# Patient Record
Sex: MALE | Race: WHITE | NOT HISPANIC OR LATINO | Employment: OTHER | URBAN - NONMETROPOLITAN AREA
[De-identification: names, ages, dates, MRNs, and addresses within clinical notes are randomized per-mention and may not be internally consistent; named-entity substitution may affect disease eponyms.]

---

## 2024-07-01 ENCOUNTER — OFFICE VISIT (OUTPATIENT)
Dept: UROLOGY | Facility: CLINIC | Age: 74
End: 2024-07-01
Payer: MEDICARE

## 2024-07-01 VITALS
HEART RATE: 76 BPM | DIASTOLIC BLOOD PRESSURE: 82 MMHG | BODY MASS INDEX: 31.61 KG/M2 | HEIGHT: 72 IN | SYSTOLIC BLOOD PRESSURE: 137 MMHG | WEIGHT: 233.4 LBS

## 2024-07-01 DIAGNOSIS — R97.20 BPH WITH ELEVATED PSA: Primary | ICD-10-CM

## 2024-07-01 DIAGNOSIS — N40.0 BPH WITH ELEVATED PSA: Primary | ICD-10-CM

## 2024-07-01 DIAGNOSIS — N40.1 BPH WITH OBSTRUCTION/LOWER URINARY TRACT SYMPTOMS: ICD-10-CM

## 2024-07-01 DIAGNOSIS — R35.0 BENIGN PROSTATIC HYPERPLASIA WITH URINARY FREQUENCY: ICD-10-CM

## 2024-07-01 DIAGNOSIS — N40.1 BENIGN PROSTATIC HYPERPLASIA WITH URINARY FREQUENCY: ICD-10-CM

## 2024-07-01 DIAGNOSIS — N13.8 BPH WITH OBSTRUCTION/LOWER URINARY TRACT SYMPTOMS: ICD-10-CM

## 2024-07-01 PROCEDURE — 1160F RVW MEDS BY RX/DR IN RCRD: CPT | Performed by: NURSE PRACTITIONER

## 2024-07-01 PROCEDURE — 99204 OFFICE O/P NEW MOD 45 MIN: CPT | Performed by: NURSE PRACTITIONER

## 2024-07-01 PROCEDURE — 1159F MED LIST DOCD IN RCRD: CPT | Performed by: NURSE PRACTITIONER

## 2024-07-01 PROCEDURE — 84153 ASSAY OF PSA TOTAL: CPT | Performed by: NURSE PRACTITIONER

## 2024-07-01 PROCEDURE — 84154 ASSAY OF PSA FREE: CPT | Performed by: NURSE PRACTITIONER

## 2024-07-01 NOTE — PROGRESS NOTES
Chief Complaint  BPH WITH ELEVATED PSA/LUTS (NEW PATIENT)    Subjective          Girish Moon presents to [facility] for BPH WITH ELEVATED PSA 35.70 /LUTS  History of Present Illness   History of Present Illness  The patient is a pleasant 74-year-old male who presents to clinic today for evaluation as a new patient consult. The patient has been referred to clinic by PCP for BPH with elevated PSA. His PSA is 35.70 and this was completed on 05/15/2024. He is unsure about any prior PSA results. The patient is relatively healthy. He reports BPH with lower urinary tract symptoms, most bothersome of which has been urinary urgency and frequency. His IPSS score in clinic today is 11. He is accompanied by his wife.    The patient's wife reports that he has not had a physical examination in 30 to 40 years. He does not take any medications. He experiences urinary frequency, approximately every 2 hours, and experiences urinary urgency. His urinary stream is generally regular, albeit with occasional variability. He experiences nocturia, waking up 3 times per night to urinate, which does not disrupt his sleep. He denies any hematuria, bladder infections, skin infections, or urinary tract infections. He also denies any perineal pain or lower back pain.     He has not undergone a prostate examination or an MRI. He maintains an active lifestyle, attending the gym approximately 3 days per week.     The patient denies smoking. He is a  and a .     He denies any family history of prostate cancer or bladder cancer.    Active Ambulatory Problems     Diagnosis Date Noted    BPH with elevated PSA 07/01/2024    Benign prostatic hyperplasia with urinary frequency 07/01/2024     Resolved Ambulatory Problems     Diagnosis Date Noted    No Resolved Ambulatory Problems     No Additional Past Medical History      Objective   Vital Signs:   /82 (BP Location:  "Left arm, Patient Position: Sitting, Cuff Size: Adult)   Pulse 76   Ht 182.9 cm (72\")   Wt 106 kg (233 lb 6.4 oz)   BMI 31.65 kg/m²       ROS:   Review of Systems   Constitutional:  Positive for activity change, appetite change, fatigue and unexpected weight gain. Negative for chills and fever.   HENT: Negative.  Negative for congestion and sinus pressure.    Eyes: Negative.  Negative for blurred vision and double vision.   Respiratory:  Positive for wheezing. Negative for shortness of breath.    Cardiovascular: Negative.    Gastrointestinal:  Positive for abdominal distention and abdominal pain. Negative for constipation, diarrhea, nausea and vomiting.   Endocrine: Negative.    Genitourinary:  Positive for urgency. Negative for difficulty urinating, discharge, dysuria, flank pain, frequency, genital sores, hematuria, penile pain, penile swelling, scrotal swelling, testicular pain and urinary incontinence.   Musculoskeletal: Negative.  Negative for back pain.   Skin:  Positive for dry skin and pallor.   Allergic/Immunologic: Negative.    Neurological:  Positive for dizziness. Negative for weakness and confusion.   Hematological: Negative.    Psychiatric/Behavioral:  Positive for sleep disturbance and stress. Negative for agitation, behavioral problems and decreased concentration.         Physical Exam  Constitutional:       General: He is in acute distress.      Appearance: He is well-developed. He is obese.   HENT:      Head: Normocephalic and atraumatic.      Right Ear: External ear normal.      Left Ear: External ear normal.   Eyes:      General:         Right eye: No discharge.         Left eye: No discharge.      Conjunctiva/sclera: Conjunctivae normal.      Pupils: Pupils are equal, round, and reactive to light.   Neck:      Thyroid: No thyromegaly.      Trachea: No tracheal deviation.   Cardiovascular:      Rate and Rhythm: Normal rate and regular rhythm.      Heart sounds: No murmur heard.     No " friction rub.   Pulmonary:      Effort: Pulmonary effort is normal. No respiratory distress.      Breath sounds: Normal breath sounds. No stridor.   Abdominal:      General: Bowel sounds are normal. There is distension.      Palpations: Abdomen is soft.      Tenderness: There is abdominal tenderness. There is no guarding.   Genitourinary:     Penis: Normal and uncircumcised. No tenderness or discharge.       Testes: Normal.      Rectum: Normal. Guaiac result negative.      Comments: BPH with urinary hesitancy/urgency.  PATIENT HAS AND ENLARGED PROSTATE/BPH WITH LUTS  ANITA POSITIVE FOR SMALL RIGHT PALPABLE NODULE.  He has good rectal tone, and ENLARGED BUT VERY FIRM PROSTATE. There is no OTHER suspicious rectal abnormalities.  Otherwise normal external genitalia. Bilateral descended testes without any masses, left slightly and hanging lower than the right testicles. There are no inguinal hernias or abnormalities noted.    Musculoskeletal:         General: No tenderness or deformity. Normal range of motion.      Cervical back: Normal range of motion and neck supple.   Skin:     General: Skin is warm and dry.      Capillary Refill: Capillary refill takes less than 2 seconds.      Coloration: Skin is not pale.   Neurological:      Mental Status: He is alert and oriented to person, place, and time.      Cranial Nerves: No cranial nerve deficit.      Motor: Weakness present.      Coordination: Coordination normal.   Psychiatric:         Behavior: Behavior normal.         Thought Content: Thought content normal.         Judgment: Judgment normal.        Physical Exam    Result Review :            Assessment and Plan    Problem List Items Addressed This Visit          Genitourinary and Reproductive     BPH with elevated PSA - Primary    Relevant Orders    PSA Total+% Free (Serial)    MRI Prostate With & Without Contrast    Benign prostatic hyperplasia with urinary frequency    Relevant Orders    PSA Total+% Free (Serial)     MRI Prostate With & Without Contrast       Assessment & Plan       ASSESSMENT                         BPH WITH ELEVATED PSA /LUTS-URGENCY/HESITANCY  MR PASCALE MAYS is a pleasant 74-year-old male who presents to clinic today for evaluation with new concerns for BPH with Elevated PSA OF  35.70 and this was completed on 05/15/2024. He is unsure about any prior PSA results. The patient is relatively healthy. He reports BPH with minor lower urinary tract symptoms, most bothersome of which has been urinary urgency and frequency. His IPSS score in clinic today is 11.     BPH: FIRST, WE Discussed the pathophysiology of BPH and obstruction. We discussed the static and dynamic effects of BPH as well as using 5 alpha reductase inhibitors versus alpha blockade.  We discussed the indications for transurethral surgery as well.  And/ or other therapeutic options available including all of the newer techniques.  He has  real symptomatology referable to his prostate other than moderate enlargement.  Recommending we proceed with extensive workup if his repeat PSA/free and total percentages become concerning.  I am also recommending an alpha blocker tamsulosin 0.4 mg capsule daily, and finasteride 5 mg daily if patient were to become symptomatic-deferred at this time.    Elevated PSA :Pt has been referred to us with a PSA of 35.70. He has a fairly enlarged enlarged/firm prostate with only minor urinary symptoms of frequency and urgency which is intermittent, depending on his fluid intake or how late he consumes any beverage prior to bedtime. We discussed the diagnosis of Elevated Prostate-Specific antigen (PSA).  I explained the pathophysiology of PSA.  It is a serine protease. It's  function in the male reproductive tract is to facilitate the liquefaction of semen.  It is for this reason the body does not want it freely floating in the serum and why it is typically bound tightly to albumin.     We discussed why we most typically will  use both a PSA free and Total to determine the need for more aggressive therapy. I discussed the normal range, and how it varies with age groups and descent.  Additionally, I explained to him typically PSA was in the range of 1-4 but more recently has been downgraded to something less than 2 or even approaching 1. I discussed the risk of family history particularly the fact that the average male has a 14% risk of prostate cancer and that in the face of a positive diagnosis in a father it will tablet and any other first-generation relative continued tablet insofar that a father and brother with prostate cancer will produce almost a 50% risk of prostate cancer.  I discussed the use of the temporal use of PSA as the best option for monitoring.  We also discussed the fact that an elevated PSA is an isolated event does not mean that this is prostate cancer and should not engender worry in this regard.     FINALLY, I also discussed other things that can elevate PSA including constipation, prostatitis, infection, recent intercourse etc, as well as the risks and benefits associated with this.  Also discussed the fact that this is a dilutional test and as a consequence of such, will occasionally produce slight variations on a single specimen.                                             PLAN  Patient has been scheduled for MRI of his prostate ON 07/12/24    He will be scheduled for prostate ultrasound-guided biopsy pending results for definitive plan of care.    Further discussed the risks and benefits of a prostate biopsy as well if indicated.      His PSA was repeated today: Free and total PSA pending to complete the work-up    Also did send urine for [facility] prostate cancer testing    I will call patient with results and definitive plan of care.    Discussed starting Flomax 0.4 mg/finasteride 5 mg for BPH/enlarged prostate/LUTS-deferred pending workup.  Will restart if negative workup    Patient/wife agreeable plan of care.        1. Benign prostatic hyperplasia with elevated PSA.  A digital rectal exam will be performed. Today, a free and total PSA test will be repeated. An MRI will be scheduled. Pending the results, a biopsy will be performed.    Follow-up  A follow-up appointment is scheduled for 2 weeks from now.    Patient reports that he is not currently experiencing any symptoms of urinary incontinence.      BMI is >= 30 and <35. (Class 1 Obesity). The following options were offered after discussion;: weight loss educational material (shared in after visit summary), exercise counseling/recommendations, and nutrition counseling/recommendations      RADIOLOGY (CT AND/OR KUB):    CT Abdomen and Pelvis: No results found for this or any previous visit.     CT Stone Protocol: No results found for this or any previous visit.     KUB: No results found for this or any previous visit.       [unfilled]  LABS (3 MONTHS):    No results found for any previous visit.        IPSS Questionnaire (AUA-7):  Over the past month…    1)  How often have you had a sensation of not emptying your bladder completely after you finish urinating?  0 - Not at all   2)  How often have you had to urinate again less than two hours after you finished urinating? 2 - Less than half the time   3)  How often have you found you stopped and started again several times when you urinated?  0 - Not at all   4) How difficult have you found it to postpone urination?  5 - Almost always   5) How often have you had a weak urinary stream?  1 - Less than 1 time in 5   6) How often have you had to push or strain to begin urination?  0 - Not at all   7) How many times did you most typically get up to urinate from the time you went to bed until the time you got up in the morning?  4 - 4 times   Total Score:  11       Follow Up   Return in about 2 weeks (around 7/15/2024) for Next scheduled follow up, FOR BPH WITH LUTS/PSA/REVIEW RESULTS-DISCUSS SCHEDULING PROSTATE  BIOPSY.    Patient was given instructions and counseling regarding his condition or for health maintenance advice. Please see specific information pulled into the AVS if appropriate.          This document has been electronically signed by Griselda Cheng-Akwa, APRN   July 1, 2024 12:52 EDT      Dictated Utilizing Dragon Dictation: Part of this note may be an electronic transcription/translation of spoken language to printed text using the Dragon Dictation System.      Patient or patient representative verbalized consent for the use of Ambient Listening during the visit with  Griselda Cheng-Akwa, APRN for chart documentation. 7/1/2024  12:52 EDT

## 2024-07-02 LAB
PSA FREE MFR SERPL: 7.8 %
PSA FREE SERPL-MCNC: 2.32 NG/ML
PSA SERPL-MCNC: 29.6 NG/ML (ref 0–4)

## 2024-07-12 ENCOUNTER — HOSPITAL ENCOUNTER (OUTPATIENT)
Dept: MRI IMAGING | Facility: HOSPITAL | Age: 74
Discharge: HOME | End: 2024-07-12
Payer: MEDICARE

## 2024-07-12 LAB — CREAT BLDA-MCNC: 1 MG/DL (ref 0.6–1.3)

## 2024-07-12 PROCEDURE — 82565 ASSAY OF CREATININE: CPT

## 2024-07-12 PROCEDURE — A9577 INJ MULTIHANCE: HCPCS | Performed by: NURSE PRACTITIONER

## 2024-07-12 PROCEDURE — 0 GADOBENATE DIMEGLUMINE 529 MG/ML SOLUTION: Performed by: NURSE PRACTITIONER

## 2024-07-12 PROCEDURE — 72197 MRI PELVIS W/O & W/DYE: CPT

## 2024-07-12 RX ADMIN — GADOBENATE DIMEGLUMINE 20 ML: 529 INJECTION, SOLUTION INTRAVENOUS at 17:16

## 2024-07-16 ENCOUNTER — OFFICE VISIT (OUTPATIENT)
Dept: UROLOGY | Facility: CLINIC | Age: 74
End: 2024-07-16
Payer: MEDICARE

## 2024-07-16 ENCOUNTER — TELEPHONE (OUTPATIENT)
Dept: UROLOGY | Facility: CLINIC | Age: 74
End: 2024-07-16

## 2024-07-16 VITALS
DIASTOLIC BLOOD PRESSURE: 87 MMHG | SYSTOLIC BLOOD PRESSURE: 139 MMHG | HEART RATE: 67 BPM | WEIGHT: 230.6 LBS | BODY MASS INDEX: 31.23 KG/M2 | HEIGHT: 72 IN

## 2024-07-16 DIAGNOSIS — Z12.5 PROSTATE CANCER SCREENING: ICD-10-CM

## 2024-07-16 DIAGNOSIS — R97.20 BPH WITH ELEVATED PSA: Primary | ICD-10-CM

## 2024-07-16 DIAGNOSIS — N13.8 BPH WITH OBSTRUCTION/LOWER URINARY TRACT SYMPTOMS: ICD-10-CM

## 2024-07-16 DIAGNOSIS — R35.0 BENIGN PROSTATIC HYPERPLASIA WITH URINARY FREQUENCY: ICD-10-CM

## 2024-07-16 DIAGNOSIS — N40.1 BPH WITH OBSTRUCTION/LOWER URINARY TRACT SYMPTOMS: ICD-10-CM

## 2024-07-16 DIAGNOSIS — N40.0 BPH WITH ELEVATED PSA: Primary | ICD-10-CM

## 2024-07-16 DIAGNOSIS — N40.1 BENIGN PROSTATIC HYPERPLASIA WITH URINARY FREQUENCY: ICD-10-CM

## 2024-07-16 PROCEDURE — 99214 OFFICE O/P EST MOD 30 MIN: CPT | Performed by: NURSE PRACTITIONER

## 2024-07-16 PROCEDURE — 1160F RVW MEDS BY RX/DR IN RCRD: CPT | Performed by: NURSE PRACTITIONER

## 2024-07-16 PROCEDURE — 1159F MED LIST DOCD IN RCRD: CPT | Performed by: NURSE PRACTITIONER

## 2024-07-16 RX ORDER — DIAZEPAM 10 MG/1
TABLET ORAL
Qty: 2 TABLET | Refills: 0 | Status: SHIPPED | OUTPATIENT
Start: 2024-07-16

## 2024-07-16 RX ORDER — CLINDAMYCIN HYDROCHLORIDE 300 MG/1
300 CAPSULE ORAL 2 TIMES DAILY
Qty: 6 CAPSULE | Refills: 0 | Status: SHIPPED | OUTPATIENT
Start: 2024-07-16 | End: 2024-07-19

## 2024-07-16 RX ORDER — CIPROFLOXACIN 500 MG/1
TABLET, FILM COATED ORAL
Qty: 4 TABLET | Refills: 0 | Status: SHIPPED | OUTPATIENT
Start: 2024-07-16

## 2024-07-16 NOTE — PROGRESS NOTES
Chief Complaint  BPH with elevated PSA (2 WEEKS FOLLOW UP-REVIEW PSA/MDX/MRI RESULTS)    Subjective          Girish Moon presents to [facility] for BPH WITH ELEVATED PSA/LUTS/-REVIEW PSA/MDX/MRI RESULTS-SCHEDULE BIOPSY  History of Present Illness   History of Present Illness  The patient is a pleasant 74-year-old male who returns to clinic today for evaluation.  He is accompanied by his wife Sandra Gardiner.  This is a 2-week follow-up for BPH with elevated PSA/LUTS most bothersome of which has been urine frequency, nocturia, and constant feeling of incomplete bladder emptying.     Initially evaluated on 07/01/2024, the patient had presented to clinic with an elevated PSA of 35.70. This was completed on 05/15/2024. The patient was unsure about any prior PSA results. It was his first time being evaluated for prostate. He is relatively healthy, reported BPH with lower urinary tract symptoms, most bothersome of which has been urinary urgency and frequency. His IPSS score on initial evaluation was 11.     Prior to being evaluated, the patient reported not been to a doctor, having a physical examination for 30 to 40 years. He is currently not on any medications and reported his urinary frequency every 2 hours, urgency and nocturia was not very bothersome to him. On his initial evaluation in clinic, we did proceed with a ANITA which had shown an enlarged prostate. It was also positive for a small LEFT> right palpable nodule. The patient did have a good rectal tone, an enlarged BUT very firm /NODULAR prostate concerning for abnormality.     Due to his positive digital rectal exam, we had discussed plan of care to include repeating his PSA results obtain a free and total. We also discussed getting an MDX urine testing and if indicated, proceed with an MRI of his prostate. He returns to clinic today for evaluation.  Urinalysis again is negative for leukocyte esterase, it is negative  "for nitrites, it is negative for gross/microscopic hematuria.    The patient's repeat PSA on 07/01/2024 was at 29.6. Nevertheless, it did show a free PSA of 7.8 percent which was significant giving patient a greater than 55 percent chance of prostate cancer.     We also proceeded with Select MDX results for prostate cancer post ANITA 07/1/2024 which was also very significant.  It showed a 95 percent likelihood of prostate cancer upon biopsy and a significant 83 percent likelihood of dictating a Iliana score that was greater than or equal to 7 for prostate cancer.     For definitive plan of care, the patient completed his MRI on 07/15/2024 which I have reviewed showing a 1.2 cm PI-RADS 4 lesion in the left peripheral zone towards the apex of the gland. The patient's prostate was enlarged also on MRI with nodular enlargement of the transitional zone. His prostate gland measured 5.9 x 6.0 x 6.0 cm.     Overall, the patient's urinary symptoms have remained stable.  He denies any bothersome effects today.  He denies dysuria or any burning with urination, he denies flank pain, abdominal pain, denies pelvic pressure or suprapubic discomfort.  He denies perineal pain, has not had any chills or fever post clinic evaluation.     He denies any family history of prostate cancer.    We discussed his definitive plan of care to include scheduling for Biopsy ASAP and patient/wife is agreeable.      Prostate ultrasound-guided biopsy have been scheduled for August 15, 2024 Dr. Murphy in clinic.    Active Ambulatory Problems     Diagnosis Date Noted    BPH with obstruction/lower urinary tract symptoms 07/01/2024    Benign prostatic hyperplasia with urinary frequency 07/01/2024    Prostate cancer screening 07/16/2024     Resolved Ambulatory Problems     Diagnosis Date Noted    No Resolved Ambulatory Problems     No Additional Past Medical History      Objective   Vital Signs:   /87   Pulse 67   Ht 182.9 cm (72.01\")   Wt 105 kg " (230 lb 9.6 oz)   BMI 31.27 kg/m²       ROS:   Review of Systems   Constitutional:  Positive for activity change, appetite change, fatigue and unexpected weight gain. Negative for chills and fever.   HENT: Negative.  Negative for congestion and sinus pressure.    Eyes: Negative.  Negative for blurred vision and double vision.   Respiratory:  Positive for wheezing. Negative for shortness of breath.    Cardiovascular: Negative.    Gastrointestinal:  Positive for abdominal distention and abdominal pain. Negative for constipation, diarrhea, nausea and vomiting.   Endocrine: Negative.    Genitourinary:  Positive for flank pain, nocturia and urgency. Negative for difficulty urinating, discharge, dysuria, frequency, genital sores, hematuria, penile pain, penile swelling, scrotal swelling, testicular pain and urinary incontinence.   Musculoskeletal:  Positive for myalgias. Negative for back pain.   Skin:  Positive for dry skin and pallor.   Allergic/Immunologic: Negative.    Neurological:  Positive for dizziness. Negative for weakness and confusion.   Hematological: Negative.    Psychiatric/Behavioral:  Positive for sleep disturbance and stress. Negative for agitation, behavioral problems and decreased concentration.         Physical Exam  Constitutional:       General: He is in acute distress.      Appearance: He is well-developed. He is obese. He is ill-appearing.   HENT:      Head: Normocephalic and atraumatic.      Right Ear: External ear normal.      Left Ear: External ear normal.   Eyes:      General:         Right eye: No discharge.         Left eye: No discharge.      Conjunctiva/sclera: Conjunctivae normal.      Pupils: Pupils are equal, round, and reactive to light.   Neck:      Thyroid: No thyromegaly.      Trachea: No tracheal deviation.   Cardiovascular:      Rate and Rhythm: Normal rate and regular rhythm.      Heart sounds: No murmur heard.     No friction rub.   Pulmonary:      Effort: Pulmonary effort is  normal. No respiratory distress.      Breath sounds: Normal breath sounds. No stridor.   Abdominal:      General: Bowel sounds are normal. There is distension.      Palpations: Abdomen is soft.      Tenderness: There is abdominal tenderness. There is no guarding.   Genitourinary:     Penis: Normal and uncircumcised. No tenderness or discharge.       Testes: Normal.      Rectum: Normal. Guaiac result negative.      Comments: BPH with LUTS, urinary frequency, urgency at times and nocturia.  ANITA positive for nodularity concerning for abnormality  Musculoskeletal:         General: Tenderness present. No deformity. Normal range of motion.      Cervical back: Normal range of motion and neck supple.   Skin:     General: Skin is warm and dry.      Capillary Refill: Capillary refill takes less than 2 seconds.      Coloration: Skin is pale.   Neurological:      Mental Status: He is alert and oriented to person, place, and time.      Cranial Nerves: No cranial nerve deficit.      Motor: Weakness present.      Coordination: Coordination normal.   Psychiatric:         Behavior: Behavior normal.         Thought Content: Thought content normal.         Judgment: Judgment normal.        Physical Exam       Result Review :         PSA          7/1/2024    09:21   PSA   PSA 29.6             Assessment and Plan    Problem List Items Addressed This Visit          Genitourinary and Reproductive     BPH with obstruction/lower urinary tract symptoms - Primary    Relevant Medications    ciprofloxacin (Cipro) 500 MG tablet    clindamycin (CLEOCIN) 300 MG capsule    diazePAM (VALIUM) 10 MG tablet    Benign prostatic hyperplasia with urinary frequency    Relevant Medications    ciprofloxacin (Cipro) 500 MG tablet    clindamycin (CLEOCIN) 300 MG capsule    diazePAM (VALIUM) 10 MG tablet    Prostate cancer screening    Relevant Medications    ciprofloxacin (Cipro) 500 MG tablet    clindamycin (CLEOCIN) 300 MG capsule    diazePAM (VALIUM) 10 MG  tablet       Assessment & Plan                                         ASSESSMENT                  BPH WITH ELEVATED PSA /LUTS-URGENCY/HESITANCY/NOCTURIA  MR PASCALE MAYS is a pleasant 74-year-old male who returns to clinic today for evaluation.  This is a 2 weeks follow-up with prior concerns for BPH with Elevated PSA OF  35.70 and this was completed on 05/15/2024.  His repeat PSA on 07/1/24 was 29.5, with a free PSA of 7%.  He is unsure about any prior PSA results.  Currently not taking any medications for his prostate.     The patient is relatively healthy. He reports BPH with minor lower urinary tract symptoms, most bothersome of which has been urinary urgency and frequency and intermittent nocturia. His last IPSS score in clinic was 11.      We discussed proceeding with MDX select urine testing post his ANITA which is also significant.  It showed a 95 percent likelihood of prostate cancer upon biopsy and a significant 83 percent likelihood of dictating a Iliana score that was greater than or equal to 7 for prostate cancer. For definitive plan of care, the patient completed his MRI on 07/15/2024 which I have reviewed showing a 1.2 cm PI-RADS 4 lesion in the left peripheral zone towards the apex of the gland.      BPH: FIRST, WE Discussed the pathophysiology of BPH and obstruction. We discussed the static and dynamic effects of BPH as well as using 5 alpha reductase inhibitors versus alpha blockade.  We discussed the indications for transurethral surgery as well.  And/ or other therapeutic options available including all of the newer techniques.  He has  real symptomatology referable to his prostate other than moderate enlargement.  Recommending we proceed with extensive workup if his repeat PSA/free and total percentages become concerning.  I am also recommending an alpha blocker tamsulosin 0.4 mg capsule daily, and finasteride 5 mg daily if patient were to become symptomatic-deferred at this time.     Elevated  PSA :Pt HAD been referred to us with a PSA of 35.70.  His repeat PSA was 29 with a free PSA of 7% giving him a greater than 55% chance of prostate cancer.  Also PI-RADS score of 4 with a 1.2 cm left peripheral zone lesion significant.  He has a fairly enlarged enlarged/firm prostate with only minor urinary symptoms of frequency and urgency which is intermittent, depending on his fluid intake or how late he consumes any beverage prior to bedtime.     We discussed the diagnosis of Elevated Prostate-Specific antigen (PSA).  I explained the pathophysiology of PSA.  It is a serine protease. It's  function in the male reproductive tract is to facilitate the liquefaction of semen.  It is for this reason the body does not want it freely floating in the serum and why it is typically bound tightly to albumin.      We discussed why we most typically will use both a PSA free and Total to determine the need for more aggressive therapy. I discussed the normal range, and how it varies with age groups and descent.  Additionally, I explained to him typically PSA was in the range of 1-4 but more recently has been downgraded to something less than 2 or even approaching 1. I discussed the risk of family history particularly the fact that the average male has a 14% risk of prostate cancer and that in the face of a positive diagnosis in a father it will tablet and any other first-generation relative continued tablet insofar that a father and brother with prostate cancer will produce almost a 50% risk of prostate cancer.  I discussed the use of the temporal use of PSA as the best option for monitoring.  We also discussed the fact that an elevated PSA is an isolated event does not mean that this is prostate cancer and should not engender worry in this regard.      FINALLY, I also discussed other things that can elevate PSA including constipation, prostatitis, infection, recent intercourse etc, as well as the risks and benefits associated  with this.  Also discussed the fact that this is a dilutional test and as a consequence of such, will occasionally produce slight variations on a single specimen.                                              PLAN  Patient has been scheduled for A Prostate Ultrasound-Guided biopsy ON  August 15, 2024 Dr. Murphy in clinic.    Recent medications-ciprofloxacin, clindamycin, Valium, and enema with preop instructions.     Patient educated to hold all blood thinners 5 to 7 days prior to procedure-reports not taking any    Patient had been scheduled for MRI of his prostate ON 07/12/24-reviewed PI-RADS 4 score     Further discussed the risks and benefits of a prostate biopsy as well if indicated.       His PSA HAD BEEN repeated : Free and total PSA to complete the [id number] 25 with free PSA of 7     Also did send urine for MDX prostate cancer testing-significantly greater than 95% likelihood upon biopsy, and 85% likelihood of Iliana score greater than equal to 7     Patient will follow-up in clinic with Dr. Murphy postbiopsy for results and definitive plan of care     Discussed starting Flomax 0.4 mg/finasteride 5 mg for BPH/enlarged prostate/LUTS-deferred pending workup.    He is to call clinic with any concerns prior to procedure     Patient/wife agreeable plan of care.     1. Benign prostatic hyperplasia with elevated PSA-SUMMARY.  The patient has been scheduled for a prostate ultrasound-guided biopsy on 08/15/2024 with Dr. Murphy. Concurrently, conservative therapy will be maintained. A follow-up appointment will be scheduled post-biopsy for the results with Dr. Charlton. The patient is advised to return sooner if any concerns arise. Medications, antibiotics, and Valium have been sent to the patient's pharmacy. Enema preparations for the biopsy have been provided to the patient and his wife.    Patient reports that he is not currently experiencing any symptoms of urinary incontinence.    RADIOLOGY (CT  AND/OR KUB):    CT Abdomen and Pelvis: No results found for this or any previous visit.     CT Stone Protocol: No results found for this or any previous visit.     KUB: No results found for this or any previous visit.       [unfilled]  LABS (3 MONTHS):    Hospital Outpatient Visit on 07/12/2024   Component Date Value Ref Range Status    Creatinine 07/12/2024 1.00  0.60 - 1.30 mg/dL Final    Serial Number: [id number]:  [id number]   Office Visit on 07/01/2024   Component Date Value Ref Range Status    PSA 07/01/2024 29.6 (H)  0.0 - 4.0 ng/mL Final    Roche ECLIA methodology.  According to the American Urological Association, Serum PSA should  decrease and remain at undetectable levels after radical  prostatectomy. The AUA defines biochemical recurrence as an initial  PSA value 0.2 ng/mL or greater followed by a subsequent confirmatory  PSA value 0.2 ng/mL or greater.  Values obtained with different assay methods or kits cannot be used  interchangeably. Results cannot be interpreted as absolute evidence  of the presence or absence of malignant disease.    PSA, Free 07/01/2024 2.32  N/A ng/mL Final    Roche ECLIA methodology.    PSA, Free % 07/01/2024 7.8  % Final    The table below lists the probability of prostate cancer for  men with non-suspicious ANITA results and total PSA between  4 and 10 ng/mL, by patient age (Tiara et al, CLEMENCIA 1998,  279:1542).                    % Free PSA       50-64 yr        65-75 yr                    0.00-10.00%        56%             55%                   10.01-15.00%        24%             35%                   15.01-20.00%        17%             23%                   20.01-25.00%        10%             20%                        >25.00%         5%              9%  Please note:  Tiara et al did not make specific                recommendations regarding the use of                percent free PSA for any other population                of men.          Smoking Cessation  Counseling:  Never a smoker.  Patient does not currently use any tobacco products.       Follow Up   Return in about 30 days (around 8/15/2024) for Next scheduled follow up, With Dr. Murphy, FOR  BPH W LUTS/ELEVATED PSA-PROSTATE US GUIDED BIOPSY.    Patient was given instructions and counseling regarding his condition or for health maintenance advice. Please see specific information pulled into the AVS if appropriate.          This document has been electronically signed by Griselda Cheng-Akwa, APRN   July 16, 2024 11:22 EDT      Dictated Utilizing Dragon Dictation: Part of this note may be an electronic transcription/translation of spoken language to printed text using the Dragon Dictation System.      Patient or patient representative verbalized consent for the use of Ambient Listening during the visit with  Griselda Cheng-Akwa, APRN for chart documentation. 7/16/2024  11:22 EDT

## 2024-08-06 DIAGNOSIS — Z12.5 PROSTATE CANCER SCREENING: ICD-10-CM

## 2024-08-06 DIAGNOSIS — N13.8 BPH WITH OBSTRUCTION/LOWER URINARY TRACT SYMPTOMS: ICD-10-CM

## 2024-08-06 DIAGNOSIS — R97.20 BPH WITH ELEVATED PSA: ICD-10-CM

## 2024-08-06 DIAGNOSIS — N40.1 BENIGN PROSTATIC HYPERPLASIA WITH URINARY FREQUENCY: ICD-10-CM

## 2024-08-06 DIAGNOSIS — N40.0 BPH WITH ELEVATED PSA: ICD-10-CM

## 2024-08-06 DIAGNOSIS — N40.1 BPH WITH OBSTRUCTION/LOWER URINARY TRACT SYMPTOMS: ICD-10-CM

## 2024-08-06 DIAGNOSIS — R35.0 BENIGN PROSTATIC HYPERPLASIA WITH URINARY FREQUENCY: ICD-10-CM

## 2024-08-06 RX ORDER — DIAZEPAM 10 MG/1
TABLET ORAL
Qty: 2 TABLET | Refills: 0 | Status: SHIPPED | OUTPATIENT
Start: 2024-08-06

## 2024-08-07 NOTE — TELEPHONE ENCOUNTER
Medications resent-valium to [facility] drug pharmacy in [facility].  Patient scheduled for prostate ultrasound-guided biopsy on 08/15/2024.

## 2024-08-15 ENCOUNTER — PROCEDURE VISIT (OUTPATIENT)
Dept: UROLOGY | Facility: CLINIC | Age: 74
End: 2024-08-15
Payer: MEDICARE

## 2024-08-15 VITALS
HEART RATE: 76 BPM | BODY MASS INDEX: 30.85 KG/M2 | WEIGHT: 227.8 LBS | HEIGHT: 72 IN | DIASTOLIC BLOOD PRESSURE: 85 MMHG | SYSTOLIC BLOOD PRESSURE: 135 MMHG

## 2024-08-15 DIAGNOSIS — N40.1 BPH WITH OBSTRUCTION/LOWER URINARY TRACT SYMPTOMS: ICD-10-CM

## 2024-08-15 DIAGNOSIS — N40.0 BPH WITH ELEVATED PSA: Primary | ICD-10-CM

## 2024-08-15 DIAGNOSIS — R97.20 BPH WITH ELEVATED PSA: Primary | ICD-10-CM

## 2024-08-15 DIAGNOSIS — R97.20 ELEVATED PROSTATE SPECIFIC ANTIGEN (PSA): ICD-10-CM

## 2024-08-15 DIAGNOSIS — N13.8 BPH WITH OBSTRUCTION/LOWER URINARY TRACT SYMPTOMS: ICD-10-CM

## 2024-08-15 DIAGNOSIS — Z48.816 AFTERCARE FOLLOWING SURGERY OF THE GENITOURINARY SYSTEM: ICD-10-CM

## 2024-08-15 RX ORDER — GENTAMICIN 40 MG/ML
80 INJECTION, SOLUTION INTRAMUSCULAR; INTRAVENOUS ONCE
Status: COMPLETED | OUTPATIENT
Start: 2024-08-15 | End: 2024-08-15

## 2024-08-15 RX ADMIN — GENTAMICIN 80 MG: 40 INJECTION, SOLUTION INTRAMUSCULAR; INTRAVENOUS at 14:21

## 2024-08-15 NOTE — PROGRESS NOTES
Chief Complaint:   Elevated PSA    HPI:    74-year-old male who returns to clinic today for evaluation.  He is accompanied by his wife Sandra Gardiner.  This is a 2-week follow-up for BPH with elevated PSA/LUTS most bothersome of which has been urine frequency, nocturia, and constant feeling of incomplete bladder emptying.      Initially evaluated on 07/01/2024, the patient had presented to clinic with an elevated PSA of 35.70. This was completed on 05/15/2024. The patient was unsure about any prior PSA results. It was his first time being evaluated for prostate. He is relatively healthy, reported BPH with lower urinary tract symptoms, most bothersome of which has been urinary urgency and frequency. His IPSS score on initial evaluation was 11.      Prior to being evaluated, the patient reported not been to a doctor, having a physical examination for 30 to 40 years. He is currently not on any medications and reported his urinary frequency every 2 hours, urgency and nocturia was not very bothersome to him. On his initial evaluation in clinic, we did proceed with a ANITA which had shown an enlarged prostate. It was also positive for a small LEFT> right palpable nodule. The patient did have a good rectal tone, an enlarged BUT very firm /NODULAR prostate concerning for abnormality.      Due to his positive digital rectal exam, we had discussed plan of care to include repeating his PSA results obtain a free and total. We also discussed getting an MDX urine testing and if indicated, proceed with an MRI of his prostate. He returns to clinic today for evaluation.  Urinalysis again is negative for leukocyte esterase, it is negative for nitrites, it is negative for gross/microscopic hematuria.     The patient's repeat PSA on 07/01/2024 was at 29.6. Nevertheless, it did show a free PSA of 7.8 percent which was significant giving patient a greater than 55 percent chance of prostate cancer.      We also proceeded with [facility] MDX  results for prostate cancer post ANITA 07/1/2024 which was also very significant.  It showed a 95 percent likelihood of prostate cancer upon biopsy and a significant 83 percent likelihood of dictating a [place] score that was greater than or equal to 7 for prostate cancer.      For definitive plan of care, the patient completed his MRI on 07/15/2024 which I have reviewed showing a 1.2 cm PI-RADS 4 lesion in the left peripheral zone towards the apex of the gland. The patient's prostate was enlarged also on MRI with nodular enlargement of the transitional zone. His prostate gland measured 5.9 x 6.0 x 6.0 cm.      Overall, the patient's urinary symptoms have remained stable.  He denies any bothersome effects today.  He denies dysuria or any burning with urination, he denies flank pain, abdominal pain, denies pelvic pressure or suprapubic discomfort.  He denies perineal pain, has not had any chills or fever post clinic evaluation.      He denies any family history of prostate cancer.     We discussed his definitive plan of care to include scheduling for Biopsy ASAP and patient/wife is agreeable.         Past Medical History:     No past medical history on file.    Current Meds:     Current Outpatient Medications   Medication Sig Dispense Refill    ciprofloxacin (Cipro) 500 MG tablet Take one tablet twice a day before procedure 4 tablet 0    diazePAM (VALIUM) 10 MG tablet One tablet night before procedure at bedtime and , THE ONE MORNING OF PROCEDURE-PLEASE BRING TO CLINIC 2 tablet 0     No current facility-administered medications for this visit.        Allergies:      No Known Allergies     Past Surgical History:     Past Surgical History:   Procedure Laterality Date    KNEE SURGERY Left        Social History:     Social History     Socioeconomic History    Marital status:    Tobacco Use    Smoking status: Never     Passive exposure: Never    Smokeless tobacco: Never   Vaping Use    Vaping status: Never Used    Substance and Sexual Activity    Alcohol use: Defer    Drug use: Defer    Sexual activity: Defer       Family History:     No family history on file.    Review of Systems:     Review of Systems   Constitutional: Negative.    HENT: Negative.     Eyes: Negative.    Respiratory: Negative.     Cardiovascular: Negative.    Gastrointestinal: Negative.    Endocrine: Negative.    Musculoskeletal: Negative.    Allergic/Immunologic: Negative.    Neurological: Negative.    Hematological: Negative.    Psychiatric/Behavioral: Negative.         Physical Exam:     Physical Exam  Vitals and nursing note reviewed.   Constitutional:       Appearance: He is well-developed.   HENT:      Head: Normocephalic and atraumatic.   Eyes:      Conjunctiva/sclera: Conjunctivae normal.      Pupils: Pupils are equal, round, and reactive to light.   Cardiovascular:      Rate and Rhythm: Normal rate and regular rhythm.      Heart sounds: Normal heart sounds.   Pulmonary:      Effort: Pulmonary effort is normal.      Breath sounds: Normal breath sounds.   Abdominal:      General: Bowel sounds are normal.      Palpations: Abdomen is soft.   Musculoskeletal:         General: Normal range of motion.      Cervical back: Normal range of motion.   Skin:     General: Skin is warm and dry.   Neurological:      Mental Status: He is alert and oriented to person, place, and time.      Deep Tendon Reflexes: Reflexes are normal and symmetric.   Psychiatric:         Behavior: Behavior normal.         Thought Content: Thought content normal.         Judgment: Judgment normal.         I have reviewed the following portions of the patient's history: Allergies, current medications, past family history, past medical history, past social history, past surgical history, problem list, and ROS and confirm it is accurate.    Recent Image (CT and/or KUB):      CT Abdomen and Pelvis: No results found for this or any previous visit.       CT Stone Protocol: No results found  for this or any previous visit.       KUB: No results found for this or any previous visit.       Labs (past 3 months):      Hospital Outpatient Visit on 07/12/2024   Component Date Value Ref Range Status    Creatinine 07/12/2024 1.00  0.60 - 1.30 mg/dL Final    Serial Number: [id number]:  [id number]   Office Visit on 07/01/2024   Component Date Value Ref Range Status    PSA 07/01/2024 29.6 (H)  0.0 - 4.0 ng/mL Final    Roche ECLIA methodology.  According to the American Urological Association, Serum PSA should  decrease and remain at undetectable levels after radical  prostatectomy. The AUA defines biochemical recurrence as an initial  PSA value 0.2 ng/mL or greater followed by a subsequent confirmatory  PSA value 0.2 ng/mL or greater.  Values obtained with different assay methods or kits cannot be used  interchangeably. Results cannot be interpreted as absolute evidence  of the presence or absence of malignant disease.    PSA, Free 07/01/2024 2.32  N/A ng/mL Final    Roche ECLIA methodology.    PSA, Free % 07/01/2024 7.8  % Final    The table below lists the probability of prostate cancer for  men with non-suspicious ANITA results and total PSA between  4 and 10 ng/mL, by patient age (Tiara et al, CLEMENCIA 1998,  279:1542).                    % Free PSA       50-64 yr        65-75 yr                    0.00-10.00%        56%             55%                   10.01-15.00%        24%             35%                   15.01-20.00%        17%             23%                   20.01-25.00%        10%             20%                        >25.00%         5%              9%  Please note:  Tiara et al did not make specific                recommendations regarding the use of                percent free PSA for any other population                of men.        Procedure:     Prostate ultrasound and biopsy:  74 year-old white male with a history of an elevated PSA and a significant increase in his risk for prostate cancer.   We discussed the prostate biopsy at length.  We discussed the significant risks of anesthesia, bleeding, infection, urosepsis, purported effects on erectile function, and rectal bleeding requiring surgical intervention.  He was given a pre-procedural enema.  He was pretreated with ciprofloxacin for 3 days.  He was given periprocedural gentamicin at the dose of 80 mg in an intramuscular fashion and given post biopsy clindamycin for 3 days.  Following an extensive informed consent, he was brought to the operative suite, placed in the left lateral decubitus position.  He was given his prophylactic gentamicin.  The rectum was anesthetized with 20 mL of 2% viscous Xylocaine jelly.  I then used the BK biplanar probe inserted into the rectum and made measurements of the prostate.  The height was 5.13 cm, the width was 5.78 cm, and the length was 6.43 cm for a volume of 99.14 g.  There were no obvious hypoechoic areas.  There was no intravesical median lobe.  There was minimal calcification between the transition and peripheral zone.  I then injected 20 mL of 1% Xylocaine in the perirectal area and raised of skin wheal to provide anesthesia after an adequate period of topical anesthesia. I used the Biopty gun to take a total of 10 cores without complication.  He tolerated this extremely well.  Cores.  There was no bleeding or complications.   Impression: Elevated PSA s/p biopsy.  Assessment/Plan:   Elevated prostate specific antigen-we discussed the diagnosis of elevated prostate-specific antigen.  I explained the pathophysiology of PSA.  It is a serine protease that's function in the male reproductive tract is to facilitate the liquefaction of semen.  It is for this reason the body does not want it freely floating in the serum and why typically bound tightly to albumin.  We discussed why we used both a PSA free and total to determine the need for more aggressive therapy. I discussed the normal range.  Additionally, it was  in the range of 1 to 4, but more recently has been downgraded to something less than 2 or even approaching 1.  I discussed the risk of family history, particularly the fact that the average male has a 14% risk of prostate cancer and that in the face of a positive diagnosis in a father it will tablet and any other first-generation relative continued tablet insofar that a father and brother with prostate cancer will produce almost a 50% risk of prostate cancer.  I discussed the use of the temporal use of PSA as the best option for monitoring.  We also discussed the fact that an elevated PSA is an isolated event does not mean that this is prostate cancer and should not engender worry in this regard. I discussed other things that can elevate PSA including constipation, prostatitis, infection, recent intercourse etc., as well as the risks and benefits associated with this.  Also discussed the fact that this is with a dilutional test and as a consequence of such were present produce slight variations on a single specimen.  Further discussed the risks and benefits of a prostate biopsy as well.              This document has been electronically signed by NELIDA DE LEÓN MD August 15, 2024 13:08 EDT    Dictated Utilizing Dragon Dictation: Part of this note may be an electronic transcription/translation of spoken language to printed text using the Dragon Dictation System.

## 2024-08-16 PROBLEM — R97.20 ELEVATED PROSTATE SPECIFIC ANTIGEN (PSA): Status: ACTIVE | Noted: 2024-08-16

## 2024-08-16 LAB — REF LAB TEST METHOD: NORMAL

## 2024-08-26 ENCOUNTER — OFFICE VISIT (OUTPATIENT)
Dept: UROLOGY | Facility: CLINIC | Age: 74
End: 2024-08-26
Payer: MEDICARE

## 2024-08-26 VITALS
WEIGHT: 233 LBS | DIASTOLIC BLOOD PRESSURE: 90 MMHG | HEIGHT: 72 IN | BODY MASS INDEX: 31.56 KG/M2 | HEART RATE: 76 BPM | SYSTOLIC BLOOD PRESSURE: 158 MMHG

## 2024-08-26 DIAGNOSIS — C61 PROSTATE CANCER: ICD-10-CM

## 2024-08-26 DIAGNOSIS — Z12.5 PROSTATE CANCER SCREENING: Primary | ICD-10-CM

## 2024-08-26 PROCEDURE — 1160F RVW MEDS BY RX/DR IN RCRD: CPT | Performed by: UROLOGY

## 2024-08-26 PROCEDURE — 99213 OFFICE O/P EST LOW 20 MIN: CPT | Performed by: UROLOGY

## 2024-08-26 PROCEDURE — 1159F MED LIST DOCD IN RCRD: CPT | Performed by: UROLOGY

## 2024-08-27 RX ORDER — BICALUTAMIDE 50 MG/1
50 TABLET, FILM COATED ORAL DAILY
Qty: 30 TABLET | Refills: 3 | Status: SHIPPED | OUTPATIENT
Start: 2024-08-27 | End: 2024-12-25

## 2024-09-11 ENCOUNTER — HOSPITAL ENCOUNTER (OUTPATIENT)
Dept: NUCLEAR MEDICINE | Facility: HOSPITAL | Age: 74
Discharge: HOME | End: 2024-09-11
Payer: MEDICARE

## 2024-09-11 DIAGNOSIS — Z12.5 PROSTATE CANCER SCREENING: ICD-10-CM

## 2024-09-11 PROCEDURE — A9561 TC99M OXIDRONATE: HCPCS | Performed by: UROLOGY

## 2024-09-11 PROCEDURE — 0 TECHNETIUM OXIDRONATE KIT: Performed by: UROLOGY

## 2024-09-11 PROCEDURE — 78306 BONE IMAGING WHOLE BODY: CPT

## 2024-09-11 RX ADMIN — TECHNETIUM TC 99M OXIDRONATE 1 DOSE: 3.15 INJECTION, POWDER, LYOPHILIZED, FOR SOLUTION INTRAVENOUS at 09:02

## 2024-09-16 ENCOUNTER — HOSPITAL ENCOUNTER (OUTPATIENT)
Dept: CT IMAGING | Facility: HOSPITAL | Age: 74
Discharge: HOME | End: 2024-09-16
Admitting: UROLOGY
Payer: MEDICARE

## 2024-09-16 DIAGNOSIS — Z12.5 PROSTATE CANCER SCREENING: ICD-10-CM

## 2024-09-16 LAB — CREAT BLDA-MCNC: 0.9 MG/DL (ref 0.6–1.3)

## 2024-09-16 PROCEDURE — 74178 CT ABD&PLV WO CNTR FLWD CNTR: CPT

## 2024-09-16 PROCEDURE — 25510000001 IOPAMIDOL 61 % SOLUTION: Performed by: UROLOGY

## 2024-09-16 PROCEDURE — 82565 ASSAY OF CREATININE: CPT

## 2024-09-16 RX ORDER — IOPAMIDOL 612 MG/ML
100 INJECTION, SOLUTION INTRAVASCULAR
Status: COMPLETED | OUTPATIENT
Start: 2024-09-16 | End: 2024-09-16

## 2024-09-16 RX ADMIN — IOPAMIDOL 100 ML: 612 INJECTION, SOLUTION INTRAVENOUS at 11:03

## 2024-09-23 PROBLEM — C61 PROSTATE CANCER: Status: ACTIVE | Noted: 2024-09-23

## 2024-09-24 ENCOUNTER — OFFICE VISIT (OUTPATIENT)
Dept: UROLOGY | Facility: CLINIC | Age: 74
End: 2024-09-24
Payer: MEDICARE

## 2024-09-24 VITALS
HEIGHT: 72 IN | WEIGHT: 228.8 LBS | HEART RATE: 68 BPM | DIASTOLIC BLOOD PRESSURE: 78 MMHG | BODY MASS INDEX: 30.99 KG/M2 | SYSTOLIC BLOOD PRESSURE: 120 MMHG

## 2024-09-24 DIAGNOSIS — C61 PROSTATE CANCER: Primary | ICD-10-CM

## 2024-09-24 PROCEDURE — 1159F MED LIST DOCD IN RCRD: CPT | Performed by: UROLOGY

## 2024-09-24 PROCEDURE — 99214 OFFICE O/P EST MOD 30 MIN: CPT | Performed by: UROLOGY

## 2024-09-24 PROCEDURE — 1160F RVW MEDS BY RX/DR IN RCRD: CPT | Performed by: UROLOGY

## 2024-09-25 DIAGNOSIS — C61 PROSTATE CANCER: Primary | ICD-10-CM

## 2024-10-16 ENCOUNTER — OFFICE VISIT (OUTPATIENT)
Dept: RADIATION ONCOLOGY | Facility: HOSPITAL | Age: 74
End: 2024-10-16
Payer: MEDICARE

## 2024-10-16 ENCOUNTER — HOSPITAL ENCOUNTER (OUTPATIENT)
Dept: RADIATION ONCOLOGY | Facility: HOSPITAL | Age: 74
Setting detail: RADIATION/ONCOLOGY SERIES
Discharge: HOME | End: 2024-10-16
Payer: MEDICARE

## 2024-10-16 VITALS
WEIGHT: 233.2 LBS | HEIGHT: 72 IN | DIASTOLIC BLOOD PRESSURE: 89 MMHG | RESPIRATION RATE: 20 BRPM | BODY MASS INDEX: 31.59 KG/M2 | HEART RATE: 74 BPM | OXYGEN SATURATION: 97 % | SYSTOLIC BLOOD PRESSURE: 153 MMHG | TEMPERATURE: 97.5 F

## 2024-10-16 DIAGNOSIS — C61 PROSTATE CANCER: Primary | ICD-10-CM

## 2024-10-16 PROCEDURE — G0463 HOSPITAL OUTPT CLINIC VISIT: HCPCS

## 2024-10-16 NOTE — PROGRESS NOTES
CONSULTATION NOTE      :                                                          1950  DATE OF CONSULTATION:                       10/16/2024   REQUESTING PHYSICIAN:                   Pablo Murphy, *  REASON FOR CONSULTATION:           Prostate cancer  - Stage IIIC (cT2c, cN0, cM0, PSA: 29.6, Grade Group: 5)         BRIEF HISTORY:  The patient is a very pleasant 74 y.o. male  with recent diagnosis of prostate cancer.  He presented with an elevated PSA value of 35.7 ng/mL 5/15/2024.  Repeat PSA 2020 was 29.6 ng/mL.  ANITA revealed significant BPH and a nodule on the right.  MRI 2024 showed heterogeneous prostate with estimated volume 99.14 cc.  There was a PI-RADS 4 suspicious index lesion at the left peripheral zone towards the apex measuring 1.2 cm.  There was no abnormality involving the seminal vesicles or neurovascular bundles.  No pathologic lymphadenopathy.  Biopsy was performed 8/15/2024.  There was presence of prostatic adenocarcinoma bilaterally.  3 cores from the right lobe showed [place]'s 5+4 = 9 0.25% of the sample from the right apex and [place]'s 3+4 = 7 involving 5% of tissue from the right base and 10% tissue from the right lateral base.  2 cores from the left lobe showed Iliana's 4+4 = 8 involving 5% tissue from the left base with presence of perineural invasion and [place]'s 3+4 = 7 involving 5% tissue from the left lateral base.  CT abdomen pelvis showed prostatic enlargement with no obvious extraprostatic spread.  No pathologic adenopathy.  There was small scattered nonspecific sclerotic lesions in the bones.  Nuclear medicine bone scan showed no evidence of metastasis.    Patient initiated bicalutamide in 2024.  He is having no hot flashes or other adverse effects.    He is otherwise healthy and active and should have predicted longevity greater than 10 years.  He is interested in definitive treatment for the prostate cancer.  He specifically asked for an  CyberKnife stereotactic body radiotherapy given the fact that he lives a distance away.    Allergy: No Known Allergies    Social History:   Social History     Socioeconomic History    Marital status:    Tobacco Use    Smoking status: Never     Passive exposure: Never    Smokeless tobacco: Never   Vaping Use    Vaping status: Never Used   Substance and Sexual Activity    Alcohol use: Defer     Comment: 18 beers per week    Drug use: Defer    Sexual activity: Defer       Past Medical History:   Past Medical History:   Diagnosis Date    Prostate cancer        Family History: family history includes Arthritis in his mother; Stroke in his father.     Surgical History:   Past Surgical History:   Procedure Laterality Date    KNEE SURGERY Left     PROSTATE BIOPSY      VARICOSE VEIN SURGERY      VEIN SURGERY          Review of Systems:   Review of Systems   HENT:   Positive for hearing loss.    Cardiovascular:  Positive for leg swelling.        Left ankle           IPSS Questionnaire (AUA-7):  Over the past month…    1)  Incomplete Emptying  How often have you had a sensation of not emptying your bladder?  1 - Less than 1 time in 5   2)  Frequency  How often have you had to urinate less than every two hours? 1 - Less than 1 time in 5   3)  Intermittency  How often have you found you stopped and started again several times when you urinated?  2 - Less than half the time   4) Urgency  How often have you found it difficult to postpone urination?  1 - Less than 1 time in 5   5) Weak Stream  How often have you had a weak urinary stream?  2 - Less than half the time   6) Straining  How often have you had to push or strain to begin urination?  1 - Less than 1 time in 5   7) Nocturia  How many times did you typically get up at night to urinate?  3 - 3 times   Total Score:  11       Quality of life due to urinary symptoms:  If you were to spend the rest of your life with your urinary condition the way it is now, how would you  "feel about that? 2-Mostly Satisfied   Urine Leakage (Incontinence) 0-No Leakage     Sexual Health Inventory  Current Status    1)  How do you rate your confidence that you could achieve and keep an erection? 1-Very Low   2) When you had erections with sexual stimulation, how often were your erections hard enough for penetration (entering your partner)? 1-Almost never or never   3)  During sexual intercourse, how often were you able to maintain your erection after you had penetrated (entered) into your partner? 1-Almost never or never   4) During sexual intercourse, how difficult was it to maintain your erection to completion of intercourse? 2-Very difficult   5) When you attempted sexual intercourse, how often was it satisfactory to you? 3-Sometime (about half the time)   Total Score: 8       Bowel Health Inventory  Current Status: 0-No problems, no rectal bleeding, no discharge, less then 5 bowel movements a day            Objective   VITAL SIGNS:   Vitals:    10/16/24 0935   BP: 153/89   Pulse: 74   Resp: 20   Temp: 97.5 °F (36.4 °C)   TempSrc: Skin   SpO2: 97%   Weight: 106 kg (233 lb 3.2 oz)   Height: 182.9 cm (72\")   PainSc: 0-No pain        Karnofsky score: 90       Physical Exam:   Physical Exam  Vitals and nursing note reviewed.   Constitutional:       Appearance: He is well-developed.   HENT:      Head: Normocephalic and atraumatic.   Cardiovascular:      Rate and Rhythm: Normal rate and regular rhythm.      Heart sounds: Normal heart sounds. No murmur heard.  Pulmonary:      Effort: Pulmonary effort is normal.      Breath sounds: Normal breath sounds. No wheezing or rales.   Abdominal:      General: Bowel sounds are normal. There is no distension.      Palpations: Abdomen is soft.      Tenderness: There is no abdominal tenderness.   Genitourinary:     Prostate: Enlarged (80 to 100 cc estimated volume, symmetric, wide, flat, firm diffusely without raised nodules.  Seminal vesicles are nonpalpable.). Not " tender and no nodules present.      Rectum: No mass, tenderness, anal fissure, external hemorrhoid or internal hemorrhoid. Normal anal tone.   Musculoskeletal:         General: No tenderness. Normal range of motion.      Cervical back: Normal range of motion and neck supple.   Lymphadenopathy:      Cervical: No cervical adenopathy.      Upper Body:      Right upper body: No supraclavicular adenopathy.      Left upper body: No supraclavicular adenopathy.   Skin:     General: Skin is warm and dry.   Neurological:      Mental Status: He is alert and oriented to person, place, and time.      Sensory: No sensory deficit.   Psychiatric:         Behavior: Behavior normal.         Thought Content: Thought content normal.         Judgment: Judgment normal.              The following portions of the patient's history were reviewed and updated as appropriate: allergies, current medications, past family history, past medical history, past social history, past surgical history, and problem list.    Assessment:   Assessment      Prostate cancer, [place]'s 5+ 4 =9, clinical stage IIIC (T2c, N0, M0), PSA 29.6 ng/mL.  We reviewed the radiotherapy treatment options using intensity-modulated radiotherapy with concurrent androgen ablation which has already been initiated versus stereotactic body radiotherapy.  Patient is most interested in stereotactic body radiotherapy.  Before proceeding with localized treatment to the prostate gland only, would like to complete staging with a PSMA PET/CT to make sure he does not have any obvious metastatic disease.  Patient is agreeable with that plan.    RECOMMENDATIONS: PSMA PET/CT evaluation.  He is overdue for colon cancer screening and is willing to have a Cologuard test.  Gold seed fiducial placement with Dr. Murphy.  He will subsequently return here for treatment planning with the intent to treat the prostate gland and seminal vesicles in 5 fractions on the CyberKnife on an every other day  treatment schedule.  If the PSMA PET scan shows extraprostatic disease or metastasis, that plan may change.    I spent a total of 55 minutes on todays visit, with more than 40 minutes in direct face to face communication, and the remainder of the time spent in reviewing the relevant history, records, available imaging, and for documentation.    Follow Up:   Return in about 4 weeks (around 11/13/2024) for Office Visit.  Diagnoses and all orders for this visit:    1. Prostate cancer (Primary)  -     NM Pet Skull Base To Mid Thigh; Future  -     Ambulatory Referral to ONC Social Work         Walker Coley MD

## 2024-10-17 ENCOUNTER — DOCUMENTATION (OUTPATIENT)
Dept: OTHER | Facility: HOSPITAL | Age: 74
End: 2024-10-17
Payer: MEDICARE

## 2024-10-17 ENCOUNTER — TELEPHONE (OUTPATIENT)
Dept: RADIATION ONCOLOGY | Facility: HOSPITAL | Age: 74
End: 2024-10-17
Payer: MEDICARE

## 2024-10-17 DIAGNOSIS — C61 PROSTATE CANCER: Primary | ICD-10-CM

## 2024-10-17 NOTE — TELEPHONE ENCOUNTER
Attempted x 2 to call pt regarding upcoming appointments.  Detailed message left.  Instructed to call office with any questions.

## 2024-10-17 NOTE — PROGRESS NOTES
Distress Screening Follow-up    Name: Girish Moon    : 1950    Diagnosis:  Prostate Cancer    Location of Distress Screening: [facility]    Distress Level: 4 (10/16/2024 10:11 AM)    Emotional Concerns:  Worry or anxiety: Y    Practical Concerns:  Treatment decisions: Y     Interventions:        Comments:  SOPHIE contacted pt to follow up on Distress Screen from recent visit. SW provided introductions and explained nature of the call. Pt communicated he is doing okay, and stated at this time him and his wife plan to drive to daily treatments. SW provided education on resources available as pt is 2 hours away. Pt stated if treatment plan changes, and its more than 5 treatments, he would like lodging assistance. SW encouraged pt to keep phone number and contact should treatment plan change. Pt was agreeable and thanked SOPHIE for the call. SW will be available should needs arise.

## 2024-10-23 ENCOUNTER — TELEPHONE (OUTPATIENT)
Dept: UROLOGY | Facility: CLINIC | Age: 74
End: 2024-10-23
Payer: MEDICARE

## 2024-10-23 DIAGNOSIS — C61 PROSTATE CANCER: Primary | ICD-10-CM

## 2024-10-23 RX ORDER — SODIUM PHOSPHATE,MONO-DIBASIC 19G-7G/118
ENEMA (ML) RECTAL
Qty: 1 ENEMA | Refills: 0 | Status: SHIPPED | OUTPATIENT
Start: 2024-10-23

## 2024-10-23 RX ORDER — DIAZEPAM 10 MG
TABLET ORAL
Qty: 2 TABLET | Refills: 0 | Status: SHIPPED | OUTPATIENT
Start: 2024-10-23

## 2024-10-23 RX ORDER — CLINDAMYCIN HCL 300 MG
300 CAPSULE ORAL 2 TIMES DAILY
Qty: 6 CAPSULE | Refills: 0 | Status: SHIPPED | OUTPATIENT
Start: 2024-10-23 | End: 2024-10-26

## 2024-10-23 RX ORDER — CIPROFLOXACIN 500 MG/1
TABLET, FILM COATED ORAL
Qty: 4 TABLET | Refills: 0 | Status: SHIPPED | OUTPATIENT
Start: 2024-10-23

## 2024-10-23 NOTE — TELEPHONE ENCOUNTER
Patient has been scheduled for fiducial marker placement. He needs enema and prescriptions sent to [facility] in [place]. Patient is not on any blood thinners.

## 2024-10-30 ENCOUNTER — HOSPITAL ENCOUNTER (OUTPATIENT)
Facility: HOSPITAL | Age: 74
Discharge: HOME | End: 2024-10-30
Payer: MEDICARE

## 2024-10-30 DIAGNOSIS — C61 PROSTATE CANCER: ICD-10-CM

## 2024-10-30 PROCEDURE — 78815 PET IMAGE W/CT SKULL-THIGH: CPT

## 2024-10-30 PROCEDURE — A9595 PIFLUFOLASTAT F 18 9 MCI SOLUTION PREFILLED SYRINGE: HCPCS | Performed by: RADIOLOGY

## 2024-10-30 PROCEDURE — 0 PIFLUFOLASTAT F 18 9 MCI SOLUTION PREFILLED SYRINGE: Performed by: RADIOLOGY

## 2024-10-30 RX ADMIN — PIFLUFOLASTAT F-18 1 DOSE: 80 INJECTION INTRAVENOUS at 11:06

## 2024-11-05 ENCOUNTER — OFFICE VISIT (OUTPATIENT)
Dept: RADIATION ONCOLOGY | Facility: HOSPITAL | Age: 74
End: 2024-11-05
Payer: MEDICARE

## 2024-11-05 ENCOUNTER — HOSPITAL ENCOUNTER (OUTPATIENT)
Dept: RADIATION ONCOLOGY | Facility: HOSPITAL | Age: 74
Setting detail: RADIATION/ONCOLOGY SERIES
Discharge: HOME | End: 2024-11-05
Payer: MEDICARE

## 2024-11-05 ENCOUNTER — HOSPITAL ENCOUNTER (OUTPATIENT)
Facility: HOSPITAL | Age: 74
Setting detail: RADIATION/ONCOLOGY SERIES
End: 2024-11-05
Payer: MEDICARE

## 2024-11-05 VITALS
SYSTOLIC BLOOD PRESSURE: 125 MMHG | DIASTOLIC BLOOD PRESSURE: 70 MMHG | RESPIRATION RATE: 20 BRPM | BODY MASS INDEX: 31.21 KG/M2 | OXYGEN SATURATION: 97 % | HEART RATE: 77 BPM | TEMPERATURE: 97.5 F | WEIGHT: 230.1 LBS

## 2024-11-05 DIAGNOSIS — C61 PROSTATE CANCER: Primary | ICD-10-CM

## 2024-11-05 PROCEDURE — G0463 HOSPITAL OUTPT CLINIC VISIT: HCPCS

## 2024-11-05 NOTE — PROGRESS NOTES
RE-EVALUATION    PATIENT:                                                      Girish Moon  :                                                          1950  DATE:                          2024   DIAGNOSIS:     Prostate cancer  - Stage IIIC (cT2c, cN0, cM0, PSA: 29.6, Grade Group: 5)         BRIEF HISTORY:  The patient is a very pleasant 74 y.o. male  with recent diagnosis of high risk prostate cancer.  He was interested in CyberKnife stereotactic body radiotherapy.  He initiated androgen ablation with bicalutamide in 2024.  PSMA PET/CT evaluation was performed 10/30/2024.  This showed expected hypermetabolism in the periphery of the left side of the prostate at the apex and mid body with SUV 53.6.  There is also uptake in the proximal seminal vesicles, SUV 25.2, consistent with direct extension into the seminal vesicles.  There were also 2 small subcentimeter left iliac lymph nodes, SUV 8.9 consistent with regional lymph node metastasis.    No Known Allergies    Review of Systems   HENT:   Positive for hearing loss.            Objective   VITAL SIGNS:   Vitals:    24 1302   BP: 125/70   Pulse: 77   Resp: 20   Temp: 97.5 °F (36.4 °C)   TempSrc: Skin   SpO2: 97%   Weight: 104 kg (230 lb 1.6 oz)   PainSc: 0-No pain        Karnofsky score: 90       Physical Exam  Vitals and nursing note reviewed.   Constitutional:       Appearance: He is well-developed.   HENT:      Head: Normocephalic and atraumatic.   Cardiovascular:      Rate and Rhythm: Normal rate and regular rhythm.      Heart sounds: Normal heart sounds. No murmur heard.  Pulmonary:      Effort: Pulmonary effort is normal.      Breath sounds: Normal breath sounds. No wheezing or rales.   Abdominal:      General: Bowel sounds are normal. There is no distension.      Palpations: Abdomen is soft.      Tenderness: There is no abdominal tenderness.   Musculoskeletal:         General: No tenderness. Normal range of motion.      Cervical  back: Normal range of motion and neck supple.   Lymphadenopathy:      Cervical: No cervical adenopathy.      Upper Body:      Right upper body: No supraclavicular adenopathy.      Left upper body: No supraclavicular adenopathy.   Skin:     General: Skin is warm and dry.   Neurological:      Mental Status: He is alert and oriented to person, place, and time.      Sensory: No sensory deficit.   Psychiatric:         Behavior: Behavior normal.         Thought Content: Thought content normal.         Judgment: Judgment normal.              The following portions of the patient's history were reviewed and updated as appropriate: allergies, current medications, past family history, past medical history, past social history, past surgical history, and problem list.    Diagnoses and all orders for this visit:    Prostate cancer      IMPRESSION: Prostate cancer, [place]'s 5+4 = 9, clinical stage increased to KAROL (T3b, N1, M0) based on PSMA PET scan.  He has already initiated androgen ablation with bicalutamide and is planning to have LHRH agonist injection on 11/21/2024 at the time of gold seed fiducial placement.  Patient had been interested in CyberKnife stereotactic body radiotherapy.  This would not be an ideal modality for him anymore since we now know that he has extraprostatic regional lymphatic metastasis.  This would best treated with moderate hypofractionated concurrent pelvic irradiation IMRT.  Patient was re-consented for IMRT treatment.    RECOMMENDATIONS: Gold seed fiducials and LHRH agonist injection with Dr. Murphy on 11/21/2024.  CT simulation for IMRT approximately 3 weeks after LHRH agonist.  The prostate gland and seminal vesicles and PET positive left iliac lymph nodes will receive a dose of 70 Wilkerson delivered over 5 and half weeks.  The remainder of the pelvic lymphatics will receive concurrent dose of 50.4 Gray.  Patient would like to be treated at the [facility].  If driving becomes too much for  him, he may consider staying at [facility] or elsewhere in [place].         Walker Coley MD    Approximate 30 minutes was spent reviewing records, assessing the patient and documentation.

## 2024-11-07 ENCOUNTER — TELEPHONE (OUTPATIENT)
Age: 74
End: 2024-11-07
Payer: MEDICARE

## 2024-11-07 NOTE — TELEPHONE ENCOUNTER
Spoke to patient to schedule SIM for prostate radiation planning per Dr. Coley's ReEval from 11/5/24. Patient scheduled for Thursday, 12/19/24 @ 1100 at [place] location. Patient verbalized understanding of and agreeable to dates/times/location of appt.

## 2024-11-20 ENCOUNTER — TELEPHONE (OUTPATIENT)
Dept: RADIATION ONCOLOGY | Facility: HOSPITAL | Age: 74
End: 2024-11-20
Payer: MEDICARE

## 2024-11-20 NOTE — TELEPHONE ENCOUNTER
Received voice mail from Sherri regarding pt's appointments.  Message sent to JOSE Vasquez, @ [place] to call Sherri regarding pt's appointments.  Received another phone call from Dian,  @ Dr. Murphy's office, asking about the pt's appointments.   She states they are unable to see any appointments in [facility].  I explained I sent Christina a message and she will contact the pt. Informed Dian per the note in Epic, Christina spoke to Mr. Moon and he is scheduled to have a CT simulation @ [facility] on 12/19/2024 @ 11:00 am.  Dian verbalized understanding.

## 2024-11-21 ENCOUNTER — PROCEDURE VISIT (OUTPATIENT)
Dept: UROLOGY | Facility: CLINIC | Age: 74
End: 2024-11-21
Payer: MEDICARE

## 2024-11-21 VITALS
SYSTOLIC BLOOD PRESSURE: 113 MMHG | BODY MASS INDEX: 31.02 KG/M2 | WEIGHT: 229 LBS | HEIGHT: 72 IN | DIASTOLIC BLOOD PRESSURE: 74 MMHG | HEART RATE: 81 BPM

## 2024-11-21 DIAGNOSIS — C61 PROSTATE CANCER: ICD-10-CM

## 2024-11-21 DIAGNOSIS — Z48.816 AFTERCARE FOLLOWING SURGERY OF THE GENITOURINARY SYSTEM: Primary | ICD-10-CM

## 2024-11-21 RX ORDER — GENTAMICIN 40 MG/ML
80 INJECTION, SOLUTION INTRAMUSCULAR; INTRAVENOUS ONCE
Status: COMPLETED | OUTPATIENT
Start: 2024-11-21 | End: 2024-11-21

## 2024-11-21 RX ADMIN — GENTAMICIN 80 MG: 40 INJECTION, SOLUTION INTRAMUSCULAR; INTRAVENOUS at 09:00

## 2024-11-21 NOTE — PROGRESS NOTES
Chief Complaint:      Chief Complaint   Patient presents with    Prostate Cancer     Fiducial Markers          HPI:   74 y.o. male with prostate cancer here for fiducial markers.    Past Medical History:     Past Medical History:   Diagnosis Date    Prostate cancer     Prostate cancer        Current Meds:     Current Outpatient Medications   Medication Sig Dispense Refill    bicalutamide (Casodex) 50 MG tablet Take 1 tablet by mouth Daily for 120 days. 30 tablet 3    ciprofloxacin (Cipro) 500 MG tablet Take one tablet twice a day before procedure 4 tablet 0    diazePAM (VALIUM) 10 MG tablet One tablet night before procedure at bedtime and one morning of one hour prior to procedure 2 tablet 0    Sodium Phosphates ([employer] Enema Disposable) 19-7 GM/118ML enema Use morning of the procedure 1 enema 0     No current facility-administered medications for this visit.        Allergies:      No Known Allergies     Past Surgical History:     Past Surgical History:   Procedure Laterality Date    KNEE SURGERY Left     PROSTATE BIOPSY      PROSTATE BIOPSY      VARICOSE VEIN SURGERY      VEIN SURGERY         Social History:     Social History     Socioeconomic History    Marital status:    Tobacco Use    Smoking status: Never     Passive exposure: Never    Smokeless tobacco: Never   Vaping Use    Vaping status: Never Used   Substance and Sexual Activity    Alcohol use: Defer     Comment: 18 beers per week    Drug use: Defer    Sexual activity: Defer       Family History:     Family History   Problem Relation Age of Onset    Stroke Father     Arthritis Mother        Review of Systems:     Review of Systems   Constitutional: Negative.    HENT: Negative.     Eyes: Negative.    Respiratory: Negative.     Cardiovascular: Negative.    Gastrointestinal: Negative.    Endocrine: Negative.    Musculoskeletal: Negative.    Allergic/Immunologic: Negative.    Neurological: Negative.    Hematological: Negative.    Psychiatric/Behavioral:  Negative.         Physical Exam:     Physical Exam  Vitals and nursing note reviewed.   Constitutional:       Appearance: He is well-developed.   HENT:      Head: Normocephalic and atraumatic.   Eyes:      Conjunctiva/sclera: Conjunctivae normal.      Pupils: Pupils are equal, round, and reactive to light.   Cardiovascular:      Rate and Rhythm: Normal rate and regular rhythm.      Heart sounds: Normal heart sounds.   Pulmonary:      Effort: Pulmonary effort is normal.      Breath sounds: Normal breath sounds.   Abdominal:      General: Bowel sounds are normal.      Palpations: Abdomen is soft.   Musculoskeletal:         General: Normal range of motion.      Cervical back: Normal range of motion.   Skin:     General: Skin is warm and dry.   Neurological:      Mental Status: He is alert and oriented to person, place, and time.      Deep Tendon Reflexes: Reflexes are normal and symmetric.   Psychiatric:         Behavior: Behavior normal.         Thought Content: Thought content normal.         Judgment: Judgment normal.         I have reviewed the following portions of the patient's history: Allergies, current medications, past family history, past medical history, past social history, past surgical history, problem list, and ROS and confirm it is accurate.    Recent Image (CT and/or KUB):      CT Abdomen and Pelvis: Results for orders placed during the hospital encounter of 09/16/24    CT Abdomen Pelvis With & Without Contrast    Narrative  EXAM:  CT Abdomen and Pelvis Without and With Intravenous Contrast    EXAM DATE:  9/16/2024 10:45 AM    CLINICAL HISTORY:  prostate cancer; Z12.5-Encounter for screening for malignant neoplasm  of prostate    TECHNIQUE:  Axial computed tomography images of the abdomen and pelvis without and  with intravenous contrast.  Sagittal and coronal reformatted images were  created and reviewed.  This CT exam was performed using one or more of  the following dose reduction techniques:   automated exposure control,  adjustment of the mA and/or kV according to patient size, and/or use of  iterative reconstruction technique.    COMPARISON:  No relevant prior studies available.    FINDINGS:  Lung bases:  Tiny nodule right lower lobe likely granuloma. 12-month  follow-up. 4.4 mm.  Lung bases otherwise clear.  No consolidation.  Mediastinum:  Small hiatal hernia.    ABDOMEN:  Liver:  Unremarkable as visualized.  No mass.  Gallbladder and bile ducts:  Unremarkable as visualized.  No calcified  stones.  No ductal dilation.  Pancreas:  Unremarkable as visualized.  No mass.  No ductal dilation.  Spleen:  Unremarkable as visualized.  No splenomegaly.  Adrenals:  Unremarkable as visualized.  No mass.  Kidneys and ureters:  Unremarkable as visualized.  No solid mass.  No  obstructing stones.  No hydronephrosis.  Stomach and bowel:  Mild sigmoid diverticulosis without  diverticulitis.  No obstruction.    PELVIS:  Appendix:  Normal appendix.  Bladder:  Urinary bladder wall thickening. Incomplete distention  versus cystitis versus chronic partial bladder outlet obstruction.  No  stones.  Reproductive:  Marked prostate enlargement.    ABDOMEN and PELVIS:  Intraperitoneal space:  Unremarkable as visualized.  No significant  fluid collection.  No pneumoperitoneum identified.  Bones/joints:  Focal sclerotic lesions left iliac bone.  Focal  sclerotic lesion right iliac bone.  Focal sclerosis left superior pubic  ramus.  Focal sclerosis left ischial aspect.  L5 pars defects with  minimal anterolisthesis of L5 on S1 and severe neuroforaminal stenosis.  No acute fracture.  No dislocation.  Soft tissues:  Fat only containing umbilical hernia.  Vasculature:  Unremarkable as visualized.  No abdominal aortic  aneurysm.  Lymph nodes:  Unremarkable as visualized.  No retroperitoneal or iliac  adenopathy identified.  Other findings:  Bilateral left greater than right varicoceles are  noted.    Impression  1. Scattered sclerotic  bone lesions which are nonspecific. Recommend  bone scan to assess for osteoblastic activity in light of patient  history.  2.  Marked prostate enlargement.  3.  Urinary bladder wall thickening. Incomplete distention versus  cystitis versus chronic partial bladder outlet obstruction.  4.  L5 pars defects with minimal anterolisthesis of L5 on S1 and severe  neuroforaminal stenosis.  5.  Mild sigmoid diverticulosis without diverticulitis.  6.  No retroperitoneal or iliac adenopathy identified. Other nonacute  findings above.      This report was finalized on 9/16/2024 11:25 AM by Dr. Yuniel Butler MD.       CT Stone Protocol: No results found for this or any previous visit.       KUB: No results found for this or any previous visit.       Labs (past 3 months):      Hospital Outpatient Visit on 09/16/2024   Component Date Value Ref Range Status    Creatinine 09/16/2024 0.90  0.60 - 1.30 mg/dL Final    Serial Number: [id number]:  [id number]        Procedure:   Prostate ultrasound and gold fiducial marker placement:  74 year-old white male with a history of prostate cancer.  .  We discussed the significant risks of anesthesia, bleeding, infection, urosepsis, purported effects on erectile function, and rectal bleeding requiring surgical intervention.  He was given a pre-procedural enema.  He was pretreated with ciprofloxacin for 3 days.  He was given periprocedural gentamicin at the dose of 80 mg in an intramuscular fashion and given post biopsy clindamycin for 3 days.  Following an extensive informed consent, he was brought to the operative suite, placed in the left lateral decubitus position.  He was given his prophylactic gentamicin.  The rectum was anesthetized with 20 mL of 2% viscous Xylocaine jelly.  I then used the GE biplanar probe inserted into the rectum. .  I then injected 20 mL of 1% Xylocaine in the perirectal area and raised of skin wheal to provide anesthesia after an adequate period of topical  anesthesia. I used the fiducial marker placement and placed a total of 6 seeds from the length of the prostate.  These were confirmed on transverse imaging.  He tolerated this extremely well.  Cores.  There was no bleeding or complications.   Impression: Prostate cancer      Assessment/Plan:   Prostate cancer:  He returns today status post biopsy for extensive discussion of prostate cancer.  We discussed staging and grading of the disease.  I described with a Iliana system being from a 2 to10 scale with the most common low-grade pattern being a [place] 6.  I discussed the staging workup including a total body bone scan and CT scan especially with a PSA greater than 10.  We discussed the various options at length. I discussed a radical retropubic prostatectomy done in the traditional fashion and using the robotic technique.  I then discussed radiation treatment both seed therapy and external beam therapy using Gold fiduciary markers.  We talked about some of the other alternatives such as a cryosurgical ablation at high intensity focused ultrasound as being viable alternatives but not recommended at this time.  He focused on aggressive watchful waiting and explained that currently low literature it's been discovered that a lot of men can actually observe it especially with numerous other comorbidities cannot have problems from the cancer by itself.  Talked about hormonal ablation and the side effects of creation of insulin resistance.  Overall, the patient was given appropriate literature, is going to think about it, and I'm going to revisit the topic with them after the next office visit.              This document has been electronically signed by NELIDA DE LEÓN MD November 21, 2024 08:58 EST    Dictated Utilizing Dragon Dictation: Part of this note may be an electronic transcription/translation of spoken language to printed text using the Dragon Dictation System.

## 2024-12-18 ENCOUNTER — HOSPITAL ENCOUNTER (OUTPATIENT)
Facility: HOSPITAL | Age: 74
Setting detail: RADIATION/ONCOLOGY SERIES
End: 2024-12-18
Payer: MEDICARE

## 2024-12-19 ENCOUNTER — HOSPITAL ENCOUNTER (OUTPATIENT)
Dept: RADIATION ONCOLOGY | Facility: HOSPITAL | Age: 74
Setting detail: RADIATION/ONCOLOGY SERIES
End: 2024-12-19
Payer: MEDICARE

## 2024-12-19 ENCOUNTER — HOSPITAL ENCOUNTER (OUTPATIENT)
Facility: HOSPITAL | Age: 74
Discharge: HOME | End: 2024-12-19

## 2024-12-19 PROCEDURE — 77399 UNLISTED PX MED RADJ PHYSICS: CPT | Performed by: RADIOLOGY

## 2024-12-26 ENCOUNTER — OFFICE VISIT (OUTPATIENT)
Dept: UROLOGY | Facility: CLINIC | Age: 74
End: 2024-12-26
Payer: MEDICARE

## 2024-12-26 ENCOUNTER — TELEPHONE (OUTPATIENT)
Dept: RADIATION ONCOLOGY | Facility: HOSPITAL | Age: 74
End: 2024-12-26
Payer: MEDICARE

## 2024-12-26 VITALS
DIASTOLIC BLOOD PRESSURE: 82 MMHG | HEIGHT: 72 IN | SYSTOLIC BLOOD PRESSURE: 128 MMHG | WEIGHT: 235.2 LBS | HEART RATE: 62 BPM | BODY MASS INDEX: 31.86 KG/M2

## 2024-12-26 DIAGNOSIS — Z12.5 PROSTATE CANCER SCREENING: ICD-10-CM

## 2024-12-26 DIAGNOSIS — C61 PROSTATE CANCER: Primary | ICD-10-CM

## 2024-12-26 RX ORDER — ANASTROZOLE 1 MG/1
1 TABLET ORAL WEEKLY
Qty: 20 TABLET | Refills: 3 | Status: SHIPPED | OUTPATIENT
Start: 2024-12-26 | End: 2026-07-03

## 2024-12-26 NOTE — PROGRESS NOTES
Chief Complaint:      Chief Complaint   Patient presents with    Prostate Cancer       HPI:   74 y.o. male returns today he has breast enlargement placed him on Arimidex and I gave him a dose of Lupron in his right buttocks without complication I will see him when he completes his course of radiation treatments.  Past Medical History:     Past Medical History:   Diagnosis Date    Prostate cancer     Prostate cancer        Current Meds:     Current Outpatient Medications   Medication Sig Dispense Refill    ciprofloxacin (Cipro) 500 MG tablet Take one tablet twice a day before procedure 4 tablet 0    diazePAM (VALIUM) 10 MG tablet One tablet night before procedure at bedtime and one morning of one hour prior to procedure 2 tablet 0    Sodium Phosphates ([employer] Enema Disposable) 19-7 GM/118ML enema Use morning of the procedure 1 enema 0     No current facility-administered medications for this visit.        Allergies:      No Known Allergies     Past Surgical History:     Past Surgical History:   Procedure Laterality Date    KNEE SURGERY Left     PROSTATE BIOPSY      PROSTATE BIOPSY      VARICOSE VEIN SURGERY      VEIN SURGERY         Social History:     Social History     Socioeconomic History    Marital status:    Tobacco Use    Smoking status: Never     Passive exposure: Never    Smokeless tobacco: Never   Vaping Use    Vaping status: Never Used   Substance and Sexual Activity    Alcohol use: Defer     Comment: 18 beers per week    Drug use: Defer    Sexual activity: Defer       Family History:     Family History   Problem Relation Age of Onset    Stroke Father     Arthritis Mother        Review of Systems:     Review of Systems   Constitutional: Negative.    HENT: Negative.     Eyes: Negative.    Respiratory: Negative.     Cardiovascular: Negative.    Gastrointestinal: Negative.    Endocrine: Negative.    Musculoskeletal: Negative.    Allergic/Immunologic: Negative.    Neurological: Negative.    Hematological:  Negative.    Psychiatric/Behavioral: Negative.         Physical Exam:     Physical Exam  Vitals and nursing note reviewed.   Constitutional:       Appearance: He is well-developed.   HENT:      Head: Normocephalic and atraumatic.   Eyes:      Conjunctiva/sclera: Conjunctivae normal.      Pupils: Pupils are equal, round, and reactive to light.   Cardiovascular:      Rate and Rhythm: Normal rate and regular rhythm.      Heart sounds: Normal heart sounds.   Pulmonary:      Effort: Pulmonary effort is normal.      Breath sounds: Normal breath sounds.   Abdominal:      General: Bowel sounds are normal.      Palpations: Abdomen is soft.   Musculoskeletal:         General: Normal range of motion.      Cervical back: Normal range of motion.   Skin:     General: Skin is warm and dry.   Neurological:      Mental Status: He is alert and oriented to person, place, and time.      Deep Tendon Reflexes: Reflexes are normal and symmetric.   Psychiatric:         Behavior: Behavior normal.         Thought Content: Thought content normal.         Judgment: Judgment normal.         I have reviewed the following portions of the patient's history: Allergies, current medications, past family history, past medical history, past social history, past surgical history, problem list, and ROS and confirm it is accurate.    Recent Image (CT and/or KUB):      CT Abdomen and Pelvis: Results for orders placed during the hospital encounter of 09/16/24    CT Abdomen Pelvis With & Without Contrast    Narrative  EXAM:  CT Abdomen and Pelvis Without and With Intravenous Contrast    EXAM DATE:  9/16/2024 10:45 AM    CLINICAL HISTORY:  prostate cancer; Z12.5-Encounter for screening for malignant neoplasm  of prostate    TECHNIQUE:  Axial computed tomography images of the abdomen and pelvis without and  with intravenous contrast.  Sagittal and coronal reformatted images were  created and reviewed.  This CT exam was performed using one or more of  the  following dose reduction techniques:  automated exposure control,  adjustment of the mA and/or kV according to patient size, and/or use of  iterative reconstruction technique.    COMPARISON:  No relevant prior studies available.    FINDINGS:  Lung bases:  Tiny nodule right lower lobe likely granuloma. 12-month  follow-up. 4.4 mm.  Lung bases otherwise clear.  No consolidation.  Mediastinum:  Small hiatal hernia.    ABDOMEN:  Liver:  Unremarkable as visualized.  No mass.  Gallbladder and bile ducts:  Unremarkable as visualized.  No calcified  stones.  No ductal dilation.  Pancreas:  Unremarkable as visualized.  No mass.  No ductal dilation.  Spleen:  Unremarkable as visualized.  No splenomegaly.  Adrenals:  Unremarkable as visualized.  No mass.  Kidneys and ureters:  Unremarkable as visualized.  No solid mass.  No  obstructing stones.  No hydronephrosis.  Stomach and bowel:  Mild sigmoid diverticulosis without  diverticulitis.  No obstruction.    PELVIS:  Appendix:  Normal appendix.  Bladder:  Urinary bladder wall thickening. Incomplete distention  versus cystitis versus chronic partial bladder outlet obstruction.  No  stones.  Reproductive:  Marked prostate enlargement.    ABDOMEN and PELVIS:  Intraperitoneal space:  Unremarkable as visualized.  No significant  fluid collection.  No pneumoperitoneum identified.  Bones/joints:  Focal sclerotic lesions left iliac bone.  Focal  sclerotic lesion right iliac bone.  Focal sclerosis left superior pubic  ramus.  Focal sclerosis left ischial aspect.  L5 pars defects with  minimal anterolisthesis of L5 on S1 and severe neuroforaminal stenosis.  No acute fracture.  No dislocation.  Soft tissues:  Fat only containing umbilical hernia.  Vasculature:  Unremarkable as visualized.  No abdominal aortic  aneurysm.  Lymph nodes:  Unremarkable as visualized.  No retroperitoneal or iliac  adenopathy identified.  Other findings:  Bilateral left greater than right varicoceles  are  noted.    Impression  1. Scattered sclerotic bone lesions which are nonspecific. Recommend  bone scan to assess for osteoblastic activity in light of patient  history.  2.  Marked prostate enlargement.  3.  Urinary bladder wall thickening. Incomplete distention versus  cystitis versus chronic partial bladder outlet obstruction.  4.  L5 pars defects with minimal anterolisthesis of L5 on S1 and severe  neuroforaminal stenosis.  5.  Mild sigmoid diverticulosis without diverticulitis.  6.  No retroperitoneal or iliac adenopathy identified. Other nonacute  findings above.      This report was finalized on 9/16/2024 11:25 AM by Dr. Yuniel Butler MD.       CT Stone Protocol: No results found for this or any previous visit.       KUB: No results found for this or any previous visit.       Labs (past 3 months):      No visits with results within 3 Month(s) from this visit.   Latest known visit with results is:   Hospital Outpatient Visit on 09/16/2024   Component Date Value Ref Range Status    Creatinine 09/16/2024 0.90  0.60 - 1.30 mg/dL Final    Serial Number: [id number]:  [id number]        Procedure:       Assessment/Plan:   Prostate cancer:  He returns today status post biopsy for extensive discussion of prostate cancer.  We discussed staging and grading of the disease.  I described with a Iliana system being from a 2 to10 scale with the most common low-grade pattern being a [place] 6.  I discussed the staging workup including a total body bone scan and CT scan especially with a PSA greater than 10.  We discussed the various options at length. I discussed a radical retropubic prostatectomy done in the traditional fashion and using the robotic technique.  I then discussed radiation treatment both seed therapy and external beam therapy using Gold fiduciary markers.  We talked about some of the other alternatives such as a cryosurgical ablation at high intensity focused ultrasound as being viable alternatives but  not recommended at this time.  He focused on aggressive watchful waiting and explained that currently low literature it's been discovered that a lot of men can actually observe it especially with numerous other comorbidities cannot have problems from the cancer by itself.  Talked about hormonal ablation and the side effects of creation of insulin resistance.  Overall, the patient was given appropriate literature, is going to think about it, and I'm going to revisit the topic with them after the next office visit.  Given 45 mg of Lupron today  Hyperestrogenism-we spoke about the role of estrogen metabolism and breakdown in the  presence of testosterone replacement therapy.  We spoke about how high estradiol levels can interfere with the improvement noted in a man on testosterone as well as significant side effects such as pseudogynecomastia.  We discussed the use of the medication Arimidex using a very judicious low-dose fashion to prevent too low of an estradiol which would precipitate bone complications.          This document has been electronically signed by NELIDA DE LEÓN MD December 26, 2024 13:49 EST    Dictated Utilizing Dragon Dictation: Part of this note may be an electronic transcription/translation of spoken language to printed text using the Dragon Dictation System.

## 2024-12-26 NOTE — TELEPHONE ENCOUNTER
Spoke with Dian @ Dr. Murphy's office.  Pt originally scheduled to receive LHRH injection with marker placement on 11/27/2024.   Pt did not receive the injection and remains on bicalutamide orally. Dian asked if pt could receive the LHRH injection either today or tomorrow.   She states pt is having breast tenderness from the bicalutamide.    Discussed with  and called Dian back.    I explained that  states he doesn't want a lapse in stopping the bicalutamide and the injection.   IE: if he gets injection today he can stop bicalutamide but if he doesn't get injection until tomorrow he needs to take pill today.   Pt can still receive radiation as planned. Dian verbalized understanding.

## 2024-12-30 PROCEDURE — 77300 RADIATION THERAPY DOSE PLAN: CPT | Performed by: RADIOLOGY

## 2024-12-30 PROCEDURE — 77338 DESIGN MLC DEVICE FOR IMRT: CPT | Performed by: RADIOLOGY

## 2024-12-30 PROCEDURE — 77301 RADIOTHERAPY DOSE PLAN IMRT: CPT | Performed by: RADIOLOGY

## 2025-01-08 ENCOUNTER — HOSPITAL ENCOUNTER (OUTPATIENT)
Facility: HOSPITAL | Age: 75
Setting detail: RADIATION/ONCOLOGY SERIES
End: 2025-01-08
Payer: MEDICARE

## 2025-01-08 ENCOUNTER — HOSPITAL ENCOUNTER (OUTPATIENT)
Facility: HOSPITAL | Age: 75
Discharge: HOME | End: 2025-01-08

## 2025-01-08 LAB
RAD ONC ARIA COURSE ID: 1
RAD ONC ARIA COURSE INTENT: NORMAL
RAD ONC ARIA COURSE LAST TREATMENT DATE: NORMAL
RAD ONC ARIA COURSE START DATE: NORMAL
RAD ONC ARIA COURSE TREATMENT ELAPSED DAYS: 0
RAD ONC ARIA FIRST TREATMENT DATE: NORMAL
RAD ONC ARIA PLAN FRACTIONS TREATED TO DATE: 1
RAD ONC ARIA PLAN ID: NORMAL
RAD ONC ARIA PLAN NAME: NORMAL
RAD ONC ARIA PLAN PRESCRIBED DOSE PER FRACTION: 2.5 GY
RAD ONC ARIA PLAN PRIMARY REFERENCE POINT: NORMAL
RAD ONC ARIA PLAN TOTAL FRACTIONS PRESCRIBED: 28
RAD ONC ARIA PLAN TOTAL PRESCRIBED DOSE: 7000 CGY
RAD ONC ARIA REFERENCE POINT DOSAGE GIVEN TO DATE: 1.8 GY
RAD ONC ARIA REFERENCE POINT DOSAGE GIVEN TO DATE: 2.5 GY
RAD ONC ARIA REFERENCE POINT ID: NORMAL
RAD ONC ARIA REFERENCE POINT ID: NORMAL
RAD ONC ARIA REFERENCE POINT SESSION DOSAGE GIVEN: 1.8 GY
RAD ONC ARIA REFERENCE POINT SESSION DOSAGE GIVEN: 2.5 GY

## 2025-01-08 PROCEDURE — 77385: CPT | Performed by: RADIOLOGY

## 2025-01-09 ENCOUNTER — DOCUMENTATION (OUTPATIENT)
Dept: NUTRITION | Facility: HOSPITAL | Age: 75
End: 2025-01-09
Payer: MEDICARE

## 2025-01-09 ENCOUNTER — HOSPITAL ENCOUNTER (OUTPATIENT)
Facility: HOSPITAL | Age: 75
Discharge: HOME | End: 2025-01-09

## 2025-01-09 LAB
RAD ONC ARIA COURSE ID: 1
RAD ONC ARIA COURSE INTENT: NORMAL
RAD ONC ARIA COURSE LAST TREATMENT DATE: NORMAL
RAD ONC ARIA COURSE START DATE: NORMAL
RAD ONC ARIA COURSE TREATMENT ELAPSED DAYS: 1
RAD ONC ARIA FIRST TREATMENT DATE: NORMAL
RAD ONC ARIA PLAN FRACTIONS TREATED TO DATE: 2
RAD ONC ARIA PLAN ID: NORMAL
RAD ONC ARIA PLAN NAME: NORMAL
RAD ONC ARIA PLAN PRESCRIBED DOSE PER FRACTION: 2.5 GY
RAD ONC ARIA PLAN PRIMARY REFERENCE POINT: NORMAL
RAD ONC ARIA PLAN TOTAL FRACTIONS PRESCRIBED: 28
RAD ONC ARIA PLAN TOTAL PRESCRIBED DOSE: 7000 CGY
RAD ONC ARIA REFERENCE POINT DOSAGE GIVEN TO DATE: 3.6 GY
RAD ONC ARIA REFERENCE POINT DOSAGE GIVEN TO DATE: 5 GY
RAD ONC ARIA REFERENCE POINT ID: NORMAL
RAD ONC ARIA REFERENCE POINT ID: NORMAL
RAD ONC ARIA REFERENCE POINT SESSION DOSAGE GIVEN: 1.8 GY
RAD ONC ARIA REFERENCE POINT SESSION DOSAGE GIVEN: 2.5 GY

## 2025-01-09 PROCEDURE — 77385: CPT | Performed by: RADIOLOGY

## 2025-01-09 NOTE — PROGRESS NOTES
"Outpatient Oncology Nutrition     Reason for Visit:     Oncology Nutrition Screening and Patient Education    Patient Name:  Girish Moon    :  1950    MRN:  [id number]    Date of Encounter: 2025    Nutrition Assessment     Diagnosis: Prostate Cancer     Radiation: The prostate gland and seminal vesicles and PET positive left iliac lymph nodes will receive a dose of 70 Wilkerson delivered over 5 and half weeks. The remainder of the pelvic lymphatics will receive concurrent dose of 50.4 Gray     Patient Active Problem List:    Patient Active Problem List   Diagnosis    BPH with obstruction/lower urinary tract symptoms    Benign prostatic hyperplasia with urinary frequency    Prostate cancer screening    Elevated prostate specific antigen (PSA)    Prostate cancer       Food / Nutrition Related History   Patient and patient's wife report he eats a few small meals daily. Patient reports he drinks coffee, water, beer and rarely diet soda. Patient also reports he works out 3 times a week at the gym.     Hydration Status   Discussed the importance of hydration, reviewed hydrating fluids, and recommended he increase/maintain his intake.    Goal: 100+ ounces     Enteral Feeding       Anthropometric Measurements     Height:    Ht Readings from Last 1 Encounters:   24 182.9 cm (72.01\")       Weight:    Wt Readings from Last 1 Encounters:   24 107 kg (235 lb 3.2 oz)       BMI:  31.89 Obese    Weight Change:  Stable since 2024; 230-235#  25- 233#    Review of Lab Data (Time Frame - 1 month / 2 month)   No recent lab work    Medication Review   Reviewed MAR 25    Nutrition Focused Physical Findings       Nutrition Impact Symptoms   No problems with eating       Physical Activity      Normal with no limitations    Current Nutritional Intake     Oral diet: Regular    Oral nutritional supplements: none    Intake: oral intake has been normal     Malnutrition Risk Assessment     Recent weight loss " over the past 6 months:  0 = No    Eating poorly because of a decreased appetite:  0 = No    Malnutrition Screening Score:     MST = 0 or 1 Patient not at risk for malnutrition    Nutrition Diagnosis     Problem    Etiology    Signs / Symptoms      Nutrition Intervention   Reviewed the importance of good nutrition during his treatment course focusing on adequate calorie, protein, nutrient and fluid intake. Advised him to be consuming smaller more frequent meals/snacks throughout the day to aid with potential nausea management. Emphasized the importance of protein and its role in the diet; reviewed high protein foods; and recommended he have a protein source at each meal/snack. Encouraged him to continue drinking Boost as needed to aid with calorie / protein intake. Patient and patient's wife deny the need for educational materials.     Goal   To achieve adequate nutritional and hydration intake.    To aid with nutrition impact symptom management as needed.    Monitoring / Evaluation   Answered their questions and both voiced understanding of information discussed. RD's contact information provided and encouraged to call with questions. Will follow up as indicated.    Estephanie Caputo RD, LD

## 2025-01-10 ENCOUNTER — HOSPITAL ENCOUNTER (OUTPATIENT)
Facility: HOSPITAL | Age: 75
Discharge: HOME | End: 2025-01-10

## 2025-01-10 LAB
RAD ONC ARIA COURSE ID: 1
RAD ONC ARIA COURSE INTENT: NORMAL
RAD ONC ARIA COURSE LAST TREATMENT DATE: NORMAL
RAD ONC ARIA COURSE START DATE: NORMAL
RAD ONC ARIA COURSE TREATMENT ELAPSED DAYS: 2
RAD ONC ARIA FIRST TREATMENT DATE: NORMAL
RAD ONC ARIA PLAN FRACTIONS TREATED TO DATE: 3
RAD ONC ARIA PLAN ID: NORMAL
RAD ONC ARIA PLAN NAME: NORMAL
RAD ONC ARIA PLAN PRESCRIBED DOSE PER FRACTION: 2.5 GY
RAD ONC ARIA PLAN PRIMARY REFERENCE POINT: NORMAL
RAD ONC ARIA PLAN TOTAL FRACTIONS PRESCRIBED: 28
RAD ONC ARIA PLAN TOTAL PRESCRIBED DOSE: 7000 CGY
RAD ONC ARIA REFERENCE POINT DOSAGE GIVEN TO DATE: 5.4 GY
RAD ONC ARIA REFERENCE POINT DOSAGE GIVEN TO DATE: 7.5 GY
RAD ONC ARIA REFERENCE POINT ID: NORMAL
RAD ONC ARIA REFERENCE POINT ID: NORMAL
RAD ONC ARIA REFERENCE POINT SESSION DOSAGE GIVEN: 1.8 GY
RAD ONC ARIA REFERENCE POINT SESSION DOSAGE GIVEN: 2.5 GY

## 2025-01-10 PROCEDURE — 77385: CPT | Performed by: RADIOLOGY

## 2025-01-13 ENCOUNTER — HOSPITAL ENCOUNTER (OUTPATIENT)
Facility: HOSPITAL | Age: 75
Discharge: HOME | End: 2025-01-13
Payer: MEDICARE

## 2025-01-13 LAB
RAD ONC ARIA COURSE ID: 1
RAD ONC ARIA COURSE INTENT: NORMAL
RAD ONC ARIA COURSE LAST TREATMENT DATE: NORMAL
RAD ONC ARIA COURSE START DATE: NORMAL
RAD ONC ARIA COURSE TREATMENT ELAPSED DAYS: 5
RAD ONC ARIA FIRST TREATMENT DATE: NORMAL
RAD ONC ARIA PLAN FRACTIONS TREATED TO DATE: 4
RAD ONC ARIA PLAN ID: NORMAL
RAD ONC ARIA PLAN NAME: NORMAL
RAD ONC ARIA PLAN PRESCRIBED DOSE PER FRACTION: 2.5 GY
RAD ONC ARIA PLAN PRIMARY REFERENCE POINT: NORMAL
RAD ONC ARIA PLAN TOTAL FRACTIONS PRESCRIBED: 28
RAD ONC ARIA PLAN TOTAL PRESCRIBED DOSE: 7000 CGY
RAD ONC ARIA REFERENCE POINT DOSAGE GIVEN TO DATE: 10 GY
RAD ONC ARIA REFERENCE POINT DOSAGE GIVEN TO DATE: 7.2 GY
RAD ONC ARIA REFERENCE POINT ID: NORMAL
RAD ONC ARIA REFERENCE POINT ID: NORMAL
RAD ONC ARIA REFERENCE POINT SESSION DOSAGE GIVEN: 1.8 GY
RAD ONC ARIA REFERENCE POINT SESSION DOSAGE GIVEN: 2.5 GY

## 2025-01-13 PROCEDURE — 77385: CPT | Performed by: RADIOLOGY

## 2025-01-14 ENCOUNTER — HOSPITAL ENCOUNTER (OUTPATIENT)
Facility: HOSPITAL | Age: 75
Discharge: HOME | End: 2025-01-14

## 2025-01-14 LAB
RAD ONC ARIA COURSE ID: 1
RAD ONC ARIA COURSE INTENT: NORMAL
RAD ONC ARIA COURSE LAST TREATMENT DATE: NORMAL
RAD ONC ARIA COURSE START DATE: NORMAL
RAD ONC ARIA COURSE TREATMENT ELAPSED DAYS: 6
RAD ONC ARIA FIRST TREATMENT DATE: NORMAL
RAD ONC ARIA PLAN FRACTIONS TREATED TO DATE: 5
RAD ONC ARIA PLAN ID: NORMAL
RAD ONC ARIA PLAN NAME: NORMAL
RAD ONC ARIA PLAN PRESCRIBED DOSE PER FRACTION: 2.5 GY
RAD ONC ARIA PLAN PRIMARY REFERENCE POINT: NORMAL
RAD ONC ARIA PLAN TOTAL FRACTIONS PRESCRIBED: 28
RAD ONC ARIA PLAN TOTAL PRESCRIBED DOSE: 7000 CGY
RAD ONC ARIA REFERENCE POINT DOSAGE GIVEN TO DATE: 12.5 GY
RAD ONC ARIA REFERENCE POINT DOSAGE GIVEN TO DATE: 9 GY
RAD ONC ARIA REFERENCE POINT ID: NORMAL
RAD ONC ARIA REFERENCE POINT ID: NORMAL
RAD ONC ARIA REFERENCE POINT SESSION DOSAGE GIVEN: 1.8 GY
RAD ONC ARIA REFERENCE POINT SESSION DOSAGE GIVEN: 2.5 GY

## 2025-01-14 PROCEDURE — 77336 RADIATION PHYSICS CONSULT: CPT | Performed by: RADIOLOGY

## 2025-01-14 PROCEDURE — 77385: CPT | Performed by: RADIOLOGY

## 2025-01-15 ENCOUNTER — HOSPITAL ENCOUNTER (OUTPATIENT)
Facility: HOSPITAL | Age: 75
Discharge: HOME | End: 2025-01-15

## 2025-01-15 LAB
RAD ONC ARIA COURSE ID: 1
RAD ONC ARIA COURSE INTENT: NORMAL
RAD ONC ARIA COURSE LAST TREATMENT DATE: NORMAL
RAD ONC ARIA COURSE START DATE: NORMAL
RAD ONC ARIA COURSE TREATMENT ELAPSED DAYS: 7
RAD ONC ARIA FIRST TREATMENT DATE: NORMAL
RAD ONC ARIA PLAN FRACTIONS TREATED TO DATE: 6
RAD ONC ARIA PLAN ID: NORMAL
RAD ONC ARIA PLAN NAME: NORMAL
RAD ONC ARIA PLAN PRESCRIBED DOSE PER FRACTION: 2.5 GY
RAD ONC ARIA PLAN PRIMARY REFERENCE POINT: NORMAL
RAD ONC ARIA PLAN TOTAL FRACTIONS PRESCRIBED: 28
RAD ONC ARIA PLAN TOTAL PRESCRIBED DOSE: 7000 CGY
RAD ONC ARIA REFERENCE POINT DOSAGE GIVEN TO DATE: 10.8 GY
RAD ONC ARIA REFERENCE POINT DOSAGE GIVEN TO DATE: 15 GY
RAD ONC ARIA REFERENCE POINT ID: NORMAL
RAD ONC ARIA REFERENCE POINT ID: NORMAL
RAD ONC ARIA REFERENCE POINT SESSION DOSAGE GIVEN: 1.8 GY
RAD ONC ARIA REFERENCE POINT SESSION DOSAGE GIVEN: 2.5 GY

## 2025-01-15 PROCEDURE — 77385: CPT | Performed by: RADIOLOGY

## 2025-01-16 ENCOUNTER — DOCUMENTATION (OUTPATIENT)
Dept: NUTRITION | Facility: HOSPITAL | Age: 75
End: 2025-01-16
Payer: MEDICARE

## 2025-01-16 ENCOUNTER — HOSPITAL ENCOUNTER (OUTPATIENT)
Facility: HOSPITAL | Age: 75
Discharge: HOME | End: 2025-01-16

## 2025-01-16 ENCOUNTER — TELEPHONE (OUTPATIENT)
Age: 75
End: 2025-01-16
Payer: MEDICARE

## 2025-01-16 VITALS — BODY MASS INDEX: 31.46 KG/M2 | WEIGHT: 232 LBS

## 2025-01-16 LAB
RAD ONC ARIA COURSE ID: 1
RAD ONC ARIA COURSE INTENT: NORMAL
RAD ONC ARIA COURSE LAST TREATMENT DATE: NORMAL
RAD ONC ARIA COURSE START DATE: NORMAL
RAD ONC ARIA COURSE TREATMENT ELAPSED DAYS: 8
RAD ONC ARIA FIRST TREATMENT DATE: NORMAL
RAD ONC ARIA PLAN FRACTIONS TREATED TO DATE: 7
RAD ONC ARIA PLAN ID: NORMAL
RAD ONC ARIA PLAN NAME: NORMAL
RAD ONC ARIA PLAN PRESCRIBED DOSE PER FRACTION: 2.5 GY
RAD ONC ARIA PLAN PRIMARY REFERENCE POINT: NORMAL
RAD ONC ARIA PLAN TOTAL FRACTIONS PRESCRIBED: 28
RAD ONC ARIA PLAN TOTAL PRESCRIBED DOSE: 7000 CGY
RAD ONC ARIA REFERENCE POINT DOSAGE GIVEN TO DATE: 12.6 GY
RAD ONC ARIA REFERENCE POINT DOSAGE GIVEN TO DATE: 17.5 GY
RAD ONC ARIA REFERENCE POINT ID: NORMAL
RAD ONC ARIA REFERENCE POINT ID: NORMAL
RAD ONC ARIA REFERENCE POINT SESSION DOSAGE GIVEN: 1.8 GY
RAD ONC ARIA REFERENCE POINT SESSION DOSAGE GIVEN: 2.5 GY

## 2025-01-16 PROCEDURE — 77385: CPT | Performed by: RADIOLOGY

## 2025-01-16 RX ORDER — TAMSULOSIN HYDROCHLORIDE 0.4 MG/1
1 CAPSULE ORAL DAILY
COMMUNITY

## 2025-01-16 NOTE — TELEPHONE ENCOUNTER
Rx for Flomax 0.4mg called to [facility] pharmacy with instructions to take 1 po daily. #30 with 11 refills.     
General: denies fever, chills, weight loss/weight gain.  HENT: denies nasal congestion, sore throat, rhinorrhea, ear pain.  Eyes: denies visual changes, blurred vision, eye discharge, eye redness.  Neck: denies neck pain, neck swelling.  CV: denies chest pain, palpitations.  Resp: denies difficulty breathing, cough.  Abdominal: +abdominal pain; denies nausea, vomiting, diarrhea, blood in stool, dark stool.  MSK: b/l LE swelling.  Neuro: denies headaches, numbness, tingling, dizziness, lightheadedness.  Skin: denies rashes, cuts, bruises.  Hematologic: denies unexplained bruises.

## 2025-01-16 NOTE — PROGRESS NOTES
ONC Nutrition    Diagnosis: Prostate Cancer      Radiation: The prostate gland and seminal vesicles and PET positive left iliac lymph nodes will receive a dose of 70 Wilkerson delivered over 5 and half weeks. The remainder of the pelvic lymphatics will receive concurrent dose of 50.4 Gray     Weight:  1/9/25- 233#   1/16/25- 232#    Patient completed 7/28 treatments today.      Patient reports no new nutrition impact symptoms at this time. Patient does complain of urinary frequency and trouble sleeping d/t getting up throughout the night to pee. RD suggested trying to get his fluid intake throughout the day and not consuming liquids past 6 PM or so.    All questions/concerns addressed at this time.     Will follow as indicated.     Estephanie Caputo RD, LD

## 2025-01-17 ENCOUNTER — HOSPITAL ENCOUNTER (OUTPATIENT)
Facility: HOSPITAL | Age: 75
Discharge: HOME | End: 2025-01-17

## 2025-01-17 LAB
RAD ONC ARIA COURSE ID: 1
RAD ONC ARIA COURSE INTENT: NORMAL
RAD ONC ARIA COURSE LAST TREATMENT DATE: NORMAL
RAD ONC ARIA COURSE START DATE: NORMAL
RAD ONC ARIA COURSE TREATMENT ELAPSED DAYS: 9
RAD ONC ARIA FIRST TREATMENT DATE: NORMAL
RAD ONC ARIA PLAN FRACTIONS TREATED TO DATE: 8
RAD ONC ARIA PLAN ID: NORMAL
RAD ONC ARIA PLAN NAME: NORMAL
RAD ONC ARIA PLAN PRESCRIBED DOSE PER FRACTION: 2.5 GY
RAD ONC ARIA PLAN PRIMARY REFERENCE POINT: NORMAL
RAD ONC ARIA PLAN TOTAL FRACTIONS PRESCRIBED: 28
RAD ONC ARIA PLAN TOTAL PRESCRIBED DOSE: 7000 CGY
RAD ONC ARIA REFERENCE POINT DOSAGE GIVEN TO DATE: 14.4 GY
RAD ONC ARIA REFERENCE POINT DOSAGE GIVEN TO DATE: 20 GY
RAD ONC ARIA REFERENCE POINT ID: NORMAL
RAD ONC ARIA REFERENCE POINT ID: NORMAL
RAD ONC ARIA REFERENCE POINT SESSION DOSAGE GIVEN: 1.8 GY
RAD ONC ARIA REFERENCE POINT SESSION DOSAGE GIVEN: 2.5 GY

## 2025-01-17 PROCEDURE — 77385: CPT | Performed by: RADIOLOGY

## 2025-01-20 ENCOUNTER — HOSPITAL ENCOUNTER (OUTPATIENT)
Facility: HOSPITAL | Age: 75
Discharge: HOME | End: 2025-01-20
Payer: MEDICARE

## 2025-01-20 LAB
RAD ONC ARIA COURSE ID: 1
RAD ONC ARIA COURSE INTENT: NORMAL
RAD ONC ARIA COURSE LAST TREATMENT DATE: NORMAL
RAD ONC ARIA COURSE START DATE: NORMAL
RAD ONC ARIA COURSE TREATMENT ELAPSED DAYS: 12
RAD ONC ARIA FIRST TREATMENT DATE: NORMAL
RAD ONC ARIA PLAN FRACTIONS TREATED TO DATE: 9
RAD ONC ARIA PLAN ID: NORMAL
RAD ONC ARIA PLAN NAME: NORMAL
RAD ONC ARIA PLAN PRESCRIBED DOSE PER FRACTION: 2.5 GY
RAD ONC ARIA PLAN PRIMARY REFERENCE POINT: NORMAL
RAD ONC ARIA PLAN TOTAL FRACTIONS PRESCRIBED: 28
RAD ONC ARIA PLAN TOTAL PRESCRIBED DOSE: 7000 CGY
RAD ONC ARIA REFERENCE POINT DOSAGE GIVEN TO DATE: 16.2 GY
RAD ONC ARIA REFERENCE POINT DOSAGE GIVEN TO DATE: 22.5 GY
RAD ONC ARIA REFERENCE POINT ID: NORMAL
RAD ONC ARIA REFERENCE POINT ID: NORMAL
RAD ONC ARIA REFERENCE POINT SESSION DOSAGE GIVEN: 1.8 GY
RAD ONC ARIA REFERENCE POINT SESSION DOSAGE GIVEN: 2.5 GY

## 2025-01-20 PROCEDURE — 77385: CPT | Performed by: RADIOLOGY

## 2025-01-21 ENCOUNTER — HOSPITAL ENCOUNTER (OUTPATIENT)
Facility: HOSPITAL | Age: 75
Discharge: HOME | End: 2025-01-21

## 2025-01-21 LAB
RAD ONC ARIA COURSE ID: 1
RAD ONC ARIA COURSE INTENT: NORMAL
RAD ONC ARIA COURSE LAST TREATMENT DATE: NORMAL
RAD ONC ARIA COURSE START DATE: NORMAL
RAD ONC ARIA COURSE TREATMENT ELAPSED DAYS: 13
RAD ONC ARIA FIRST TREATMENT DATE: NORMAL
RAD ONC ARIA PLAN FRACTIONS TREATED TO DATE: 10
RAD ONC ARIA PLAN ID: NORMAL
RAD ONC ARIA PLAN NAME: NORMAL
RAD ONC ARIA PLAN PRESCRIBED DOSE PER FRACTION: 2.5 GY
RAD ONC ARIA PLAN PRIMARY REFERENCE POINT: NORMAL
RAD ONC ARIA PLAN TOTAL FRACTIONS PRESCRIBED: 28
RAD ONC ARIA PLAN TOTAL PRESCRIBED DOSE: 7000 CGY
RAD ONC ARIA REFERENCE POINT DOSAGE GIVEN TO DATE: 18 GY
RAD ONC ARIA REFERENCE POINT DOSAGE GIVEN TO DATE: 25 GY
RAD ONC ARIA REFERENCE POINT ID: NORMAL
RAD ONC ARIA REFERENCE POINT ID: NORMAL
RAD ONC ARIA REFERENCE POINT SESSION DOSAGE GIVEN: 1.8 GY
RAD ONC ARIA REFERENCE POINT SESSION DOSAGE GIVEN: 2.5 GY

## 2025-01-21 PROCEDURE — 77385: CPT | Performed by: RADIOLOGY

## 2025-01-21 PROCEDURE — 77336 RADIATION PHYSICS CONSULT: CPT | Performed by: RADIOLOGY

## 2025-01-22 ENCOUNTER — HOSPITAL ENCOUNTER (OUTPATIENT)
Facility: HOSPITAL | Age: 75
Discharge: HOME | End: 2025-01-22

## 2025-01-22 LAB
RAD ONC ARIA COURSE ID: 1
RAD ONC ARIA COURSE INTENT: NORMAL
RAD ONC ARIA COURSE LAST TREATMENT DATE: NORMAL
RAD ONC ARIA COURSE START DATE: NORMAL
RAD ONC ARIA COURSE TREATMENT ELAPSED DAYS: 14
RAD ONC ARIA FIRST TREATMENT DATE: NORMAL
RAD ONC ARIA PLAN FRACTIONS TREATED TO DATE: 11
RAD ONC ARIA PLAN ID: NORMAL
RAD ONC ARIA PLAN NAME: NORMAL
RAD ONC ARIA PLAN PRESCRIBED DOSE PER FRACTION: 2.5 GY
RAD ONC ARIA PLAN PRIMARY REFERENCE POINT: NORMAL
RAD ONC ARIA PLAN TOTAL FRACTIONS PRESCRIBED: 28
RAD ONC ARIA PLAN TOTAL PRESCRIBED DOSE: 7000 CGY
RAD ONC ARIA REFERENCE POINT DOSAGE GIVEN TO DATE: 19.8 GY
RAD ONC ARIA REFERENCE POINT DOSAGE GIVEN TO DATE: 27.5 GY
RAD ONC ARIA REFERENCE POINT ID: NORMAL
RAD ONC ARIA REFERENCE POINT ID: NORMAL
RAD ONC ARIA REFERENCE POINT SESSION DOSAGE GIVEN: 1.8 GY
RAD ONC ARIA REFERENCE POINT SESSION DOSAGE GIVEN: 2.5 GY

## 2025-01-22 PROCEDURE — 77385: CPT | Performed by: RADIOLOGY

## 2025-01-23 ENCOUNTER — DOCUMENTATION (OUTPATIENT)
Dept: NUTRITION | Facility: HOSPITAL | Age: 75
End: 2025-01-23
Payer: MEDICARE

## 2025-01-23 ENCOUNTER — HOSPITAL ENCOUNTER (OUTPATIENT)
Facility: HOSPITAL | Age: 75
Discharge: HOME | End: 2025-01-23

## 2025-01-23 VITALS — BODY MASS INDEX: 31.05 KG/M2 | WEIGHT: 229 LBS

## 2025-01-23 DIAGNOSIS — C61 PROSTATE CANCER: Primary | ICD-10-CM

## 2025-01-23 LAB
RAD ONC ARIA COURSE ID: 1
RAD ONC ARIA COURSE INTENT: NORMAL
RAD ONC ARIA COURSE LAST TREATMENT DATE: NORMAL
RAD ONC ARIA COURSE START DATE: NORMAL
RAD ONC ARIA COURSE TREATMENT ELAPSED DAYS: 15
RAD ONC ARIA FIRST TREATMENT DATE: NORMAL
RAD ONC ARIA PLAN FRACTIONS TREATED TO DATE: 12
RAD ONC ARIA PLAN ID: NORMAL
RAD ONC ARIA PLAN NAME: NORMAL
RAD ONC ARIA PLAN PRESCRIBED DOSE PER FRACTION: 2.5 GY
RAD ONC ARIA PLAN PRIMARY REFERENCE POINT: NORMAL
RAD ONC ARIA PLAN TOTAL FRACTIONS PRESCRIBED: 28
RAD ONC ARIA PLAN TOTAL PRESCRIBED DOSE: 7000 CGY
RAD ONC ARIA REFERENCE POINT DOSAGE GIVEN TO DATE: 21.6 GY
RAD ONC ARIA REFERENCE POINT DOSAGE GIVEN TO DATE: 30 GY
RAD ONC ARIA REFERENCE POINT ID: NORMAL
RAD ONC ARIA REFERENCE POINT ID: NORMAL
RAD ONC ARIA REFERENCE POINT SESSION DOSAGE GIVEN: 1.8 GY
RAD ONC ARIA REFERENCE POINT SESSION DOSAGE GIVEN: 2.5 GY

## 2025-01-23 PROCEDURE — 77385: CPT | Performed by: RADIOLOGY

## 2025-01-23 RX ORDER — ALFUZOSIN HYDROCHLORIDE 10 MG/1
10 TABLET, EXTENDED RELEASE ORAL DAILY
Qty: 30 TABLET | Refills: 11 | Status: SHIPPED | OUTPATIENT
Start: 2025-01-23

## 2025-01-23 NOTE — PROGRESS NOTES
ONC Nutrition     Diagnosis: Prostate Cancer      Radiation: The prostate gland and seminal vesicles and PET positive left iliac lymph nodes will receive a dose of 70 Wilkerson delivered over 5 and half weeks. The remainder of the pelvic lymphatics will receive concurrent dose of 50.4 Gray      Weight:  1/9/25- 233#   1/16/25- 232#  1/23/25- 229#     Patient completed 12/28 treatments today.       Patient reports no new nutrition impact symptoms at this time. Patient does complain of dizziness. Patient reports he's drinking green tea, some gatorade and water. Dr. Coley changed a medication to hopefully help with the dizziness. Patient does report difficulty with staying on a consistent meal pattern d/t his schedule.      All questions/concerns addressed at this time.      Will follow as indicated.      Estephanie Caputo RD, LD

## 2025-01-24 ENCOUNTER — HOSPITAL ENCOUNTER (OUTPATIENT)
Facility: HOSPITAL | Age: 75
Discharge: HOME | End: 2025-01-24

## 2025-01-24 LAB
RAD ONC ARIA COURSE ID: 1
RAD ONC ARIA COURSE INTENT: NORMAL
RAD ONC ARIA COURSE LAST TREATMENT DATE: NORMAL
RAD ONC ARIA COURSE START DATE: NORMAL
RAD ONC ARIA COURSE TREATMENT ELAPSED DAYS: 16
RAD ONC ARIA FIRST TREATMENT DATE: NORMAL
RAD ONC ARIA PLAN FRACTIONS TREATED TO DATE: 13
RAD ONC ARIA PLAN ID: NORMAL
RAD ONC ARIA PLAN NAME: NORMAL
RAD ONC ARIA PLAN PRESCRIBED DOSE PER FRACTION: 2.5 GY
RAD ONC ARIA PLAN PRIMARY REFERENCE POINT: NORMAL
RAD ONC ARIA PLAN TOTAL FRACTIONS PRESCRIBED: 28
RAD ONC ARIA PLAN TOTAL PRESCRIBED DOSE: 7000 CGY
RAD ONC ARIA REFERENCE POINT DOSAGE GIVEN TO DATE: 23.4 GY
RAD ONC ARIA REFERENCE POINT DOSAGE GIVEN TO DATE: 32.5 GY
RAD ONC ARIA REFERENCE POINT ID: NORMAL
RAD ONC ARIA REFERENCE POINT ID: NORMAL
RAD ONC ARIA REFERENCE POINT SESSION DOSAGE GIVEN: 1.8 GY
RAD ONC ARIA REFERENCE POINT SESSION DOSAGE GIVEN: 2.5 GY

## 2025-01-24 PROCEDURE — 77385: CPT | Performed by: RADIOLOGY

## 2025-01-27 ENCOUNTER — HOSPITAL ENCOUNTER (OUTPATIENT)
Facility: HOSPITAL | Age: 75
Discharge: HOME | End: 2025-01-27
Payer: MEDICARE

## 2025-01-27 ENCOUNTER — DOCUMENTATION (OUTPATIENT)
Dept: RADIATION ONCOLOGY | Facility: HOSPITAL | Age: 75
End: 2025-01-27
Payer: MEDICARE

## 2025-01-27 ENCOUNTER — DOCUMENTATION (OUTPATIENT)
Age: 75
End: 2025-01-27
Payer: MEDICARE

## 2025-01-27 VITALS — DIASTOLIC BLOOD PRESSURE: 55 MMHG | HEART RATE: 80 BPM | SYSTOLIC BLOOD PRESSURE: 79 MMHG

## 2025-01-27 LAB
RAD ONC ARIA COURSE ID: 1
RAD ONC ARIA COURSE INTENT: NORMAL
RAD ONC ARIA COURSE LAST TREATMENT DATE: NORMAL
RAD ONC ARIA COURSE START DATE: NORMAL
RAD ONC ARIA COURSE TREATMENT ELAPSED DAYS: 19
RAD ONC ARIA FIRST TREATMENT DATE: NORMAL
RAD ONC ARIA PLAN FRACTIONS TREATED TO DATE: 14
RAD ONC ARIA PLAN ID: NORMAL
RAD ONC ARIA PLAN NAME: NORMAL
RAD ONC ARIA PLAN PRESCRIBED DOSE PER FRACTION: 2.5 GY
RAD ONC ARIA PLAN PRIMARY REFERENCE POINT: NORMAL
RAD ONC ARIA PLAN TOTAL FRACTIONS PRESCRIBED: 28
RAD ONC ARIA PLAN TOTAL PRESCRIBED DOSE: 7000 CGY
RAD ONC ARIA REFERENCE POINT DOSAGE GIVEN TO DATE: 25.2 GY
RAD ONC ARIA REFERENCE POINT DOSAGE GIVEN TO DATE: 35 GY
RAD ONC ARIA REFERENCE POINT ID: NORMAL
RAD ONC ARIA REFERENCE POINT ID: NORMAL
RAD ONC ARIA REFERENCE POINT SESSION DOSAGE GIVEN: 1.8 GY
RAD ONC ARIA REFERENCE POINT SESSION DOSAGE GIVEN: 2.5 GY

## 2025-01-27 PROCEDURE — 77385: CPT | Performed by: RADIOLOGY

## 2025-01-27 NOTE — PROGRESS NOTES
Patient's wife notified staff at time of radiation treatment today that patient has been experiencing dizziness and lightheadedness over the weekend. She reports that the patient passed out on Friday at home. On Thursday, 1/23/25, patient was advised to stop Flomax and start of Alfuzosin due to symptoms of dizziness reported at his status check visit. Patient's wife states that he took one dose of the Alfuzosin on Friday and passed out some time after taking that dose. He has not taken any Flomax or Alfuzosin since Friday. Patient was seen by Dr. Kim in the radiation clinic today. Patient was noted to be orthostatic with sitting BP =93/51, HR = 80, and standing BP of 79/55, HR = 80. Patient was instructed by Dr. Kim to increase fluid intake throughout the day and to limit fluid intake after 5-6:00pm due to nocturia. Patient verbalized understanding of instructions. Advised to check in with nursing staff tomorrow for BP check.     Contacted Dian Murphy's office (she reported patient symptoms to Nurse at [facility] this morning) to update on patient assessment and recommendations.

## 2025-01-27 NOTE — PROGRESS NOTES
RADIATION ONCOLOGY PROGRESS NOTE  01/27/25    Received vm from Dian,  for Dr. Murphy. Dian states pt's wife called her stating the pt had been very dizzy over the last several days and that he had passed out this weekend.  Dian asked that our office contact pt's wife or her regarding advise for pt.  Christina RN, at [place] where pt's is been treated notified of message.

## 2025-01-28 ENCOUNTER — HOSPITAL ENCOUNTER (OUTPATIENT)
Facility: HOSPITAL | Age: 75
Discharge: HOME | End: 2025-01-28

## 2025-01-28 ENCOUNTER — DOCUMENTATION (OUTPATIENT)
Age: 75
End: 2025-01-28
Payer: MEDICARE

## 2025-01-28 LAB
RAD ONC ARIA COURSE ID: 1
RAD ONC ARIA COURSE INTENT: NORMAL
RAD ONC ARIA COURSE LAST TREATMENT DATE: NORMAL
RAD ONC ARIA COURSE START DATE: NORMAL
RAD ONC ARIA COURSE TREATMENT ELAPSED DAYS: 20
RAD ONC ARIA FIRST TREATMENT DATE: NORMAL
RAD ONC ARIA PLAN FRACTIONS TREATED TO DATE: 15
RAD ONC ARIA PLAN ID: NORMAL
RAD ONC ARIA PLAN NAME: NORMAL
RAD ONC ARIA PLAN PRESCRIBED DOSE PER FRACTION: 2.5 GY
RAD ONC ARIA PLAN PRIMARY REFERENCE POINT: NORMAL
RAD ONC ARIA PLAN TOTAL FRACTIONS PRESCRIBED: 28
RAD ONC ARIA PLAN TOTAL PRESCRIBED DOSE: 7000 CGY
RAD ONC ARIA REFERENCE POINT DOSAGE GIVEN TO DATE: 27 GY
RAD ONC ARIA REFERENCE POINT DOSAGE GIVEN TO DATE: 37.5 GY
RAD ONC ARIA REFERENCE POINT ID: NORMAL
RAD ONC ARIA REFERENCE POINT ID: NORMAL
RAD ONC ARIA REFERENCE POINT SESSION DOSAGE GIVEN: 1.8 GY
RAD ONC ARIA REFERENCE POINT SESSION DOSAGE GIVEN: 2.5 GY

## 2025-01-28 PROCEDURE — 77336 RADIATION PHYSICS CONSULT: CPT | Performed by: RADIOLOGY

## 2025-01-28 PROCEDURE — 77385: CPT | Performed by: RADIOLOGY

## 2025-01-28 NOTE — PROGRESS NOTES
Patient to see nursing today after radiation treatment for status update pertaining to dizziness and lightheadedness and vital signs check. Patient reports that he is feeling significantly better today and has not had any dizziness since waking up this morning. Sitting BP = 102/67, HR=68; standing BP 95/64, HR 72. Patient states he ate a normal dinner last night and didn't have issues with urination overnight. Encouraged him to continue to drink plenty of fluids and to let us know if he has any new or returning symptoms. Patient and wife verbalized understanding.

## 2025-01-29 ENCOUNTER — HOSPITAL ENCOUNTER (OUTPATIENT)
Facility: HOSPITAL | Age: 75
Discharge: HOME | End: 2025-01-29

## 2025-01-29 LAB
RAD ONC ARIA COURSE ID: 1
RAD ONC ARIA COURSE INTENT: NORMAL
RAD ONC ARIA COURSE LAST TREATMENT DATE: NORMAL
RAD ONC ARIA COURSE START DATE: NORMAL
RAD ONC ARIA COURSE TREATMENT ELAPSED DAYS: 21
RAD ONC ARIA FIRST TREATMENT DATE: NORMAL
RAD ONC ARIA PLAN FRACTIONS TREATED TO DATE: 16
RAD ONC ARIA PLAN ID: NORMAL
RAD ONC ARIA PLAN NAME: NORMAL
RAD ONC ARIA PLAN PRESCRIBED DOSE PER FRACTION: 2.5 GY
RAD ONC ARIA PLAN PRIMARY REFERENCE POINT: NORMAL
RAD ONC ARIA PLAN TOTAL FRACTIONS PRESCRIBED: 28
RAD ONC ARIA PLAN TOTAL PRESCRIBED DOSE: 7000 CGY
RAD ONC ARIA REFERENCE POINT DOSAGE GIVEN TO DATE: 28.8 GY
RAD ONC ARIA REFERENCE POINT DOSAGE GIVEN TO DATE: 40 GY
RAD ONC ARIA REFERENCE POINT ID: NORMAL
RAD ONC ARIA REFERENCE POINT ID: NORMAL
RAD ONC ARIA REFERENCE POINT SESSION DOSAGE GIVEN: 1.8 GY
RAD ONC ARIA REFERENCE POINT SESSION DOSAGE GIVEN: 2.5 GY

## 2025-01-29 PROCEDURE — 77385: CPT | Performed by: RADIOLOGY

## 2025-01-30 ENCOUNTER — HOSPITAL ENCOUNTER (OUTPATIENT)
Facility: HOSPITAL | Age: 75
Discharge: HOME | End: 2025-01-30

## 2025-01-30 VITALS — WEIGHT: 231.3 LBS | BODY MASS INDEX: 31.36 KG/M2

## 2025-01-30 LAB
RAD ONC ARIA COURSE ID: 1
RAD ONC ARIA COURSE INTENT: NORMAL
RAD ONC ARIA COURSE LAST TREATMENT DATE: NORMAL
RAD ONC ARIA COURSE START DATE: NORMAL
RAD ONC ARIA COURSE TREATMENT ELAPSED DAYS: 22
RAD ONC ARIA FIRST TREATMENT DATE: NORMAL
RAD ONC ARIA PLAN FRACTIONS TREATED TO DATE: 17
RAD ONC ARIA PLAN ID: NORMAL
RAD ONC ARIA PLAN NAME: NORMAL
RAD ONC ARIA PLAN PRESCRIBED DOSE PER FRACTION: 2.5 GY
RAD ONC ARIA PLAN PRIMARY REFERENCE POINT: NORMAL
RAD ONC ARIA PLAN TOTAL FRACTIONS PRESCRIBED: 28
RAD ONC ARIA PLAN TOTAL PRESCRIBED DOSE: 7000 CGY
RAD ONC ARIA REFERENCE POINT DOSAGE GIVEN TO DATE: 30.6 GY
RAD ONC ARIA REFERENCE POINT DOSAGE GIVEN TO DATE: 42.5 GY
RAD ONC ARIA REFERENCE POINT ID: NORMAL
RAD ONC ARIA REFERENCE POINT ID: NORMAL
RAD ONC ARIA REFERENCE POINT SESSION DOSAGE GIVEN: 1.8 GY
RAD ONC ARIA REFERENCE POINT SESSION DOSAGE GIVEN: 2.5 GY

## 2025-01-30 PROCEDURE — 77385: CPT | Performed by: RADIOLOGY

## 2025-01-31 ENCOUNTER — HOSPITAL ENCOUNTER (OUTPATIENT)
Facility: HOSPITAL | Age: 75
Discharge: HOME | End: 2025-01-31

## 2025-01-31 LAB
RAD ONC ARIA COURSE ID: 1
RAD ONC ARIA COURSE INTENT: NORMAL
RAD ONC ARIA COURSE LAST TREATMENT DATE: NORMAL
RAD ONC ARIA COURSE START DATE: NORMAL
RAD ONC ARIA COURSE TREATMENT ELAPSED DAYS: 23
RAD ONC ARIA FIRST TREATMENT DATE: NORMAL
RAD ONC ARIA PLAN FRACTIONS TREATED TO DATE: 18
RAD ONC ARIA PLAN ID: NORMAL
RAD ONC ARIA PLAN NAME: NORMAL
RAD ONC ARIA PLAN PRESCRIBED DOSE PER FRACTION: 2.5 GY
RAD ONC ARIA PLAN PRIMARY REFERENCE POINT: NORMAL
RAD ONC ARIA PLAN TOTAL FRACTIONS PRESCRIBED: 28
RAD ONC ARIA PLAN TOTAL PRESCRIBED DOSE: 7000 CGY
RAD ONC ARIA REFERENCE POINT DOSAGE GIVEN TO DATE: 32.4 GY
RAD ONC ARIA REFERENCE POINT DOSAGE GIVEN TO DATE: 45 GY
RAD ONC ARIA REFERENCE POINT ID: NORMAL
RAD ONC ARIA REFERENCE POINT ID: NORMAL
RAD ONC ARIA REFERENCE POINT SESSION DOSAGE GIVEN: 1.8 GY
RAD ONC ARIA REFERENCE POINT SESSION DOSAGE GIVEN: 2.5 GY

## 2025-01-31 PROCEDURE — 77385: CPT | Performed by: RADIOLOGY

## 2025-02-03 ENCOUNTER — HOSPITAL ENCOUNTER (OUTPATIENT)
Facility: HOSPITAL | Age: 75
Setting detail: RADIATION/ONCOLOGY SERIES
End: 2025-02-03
Payer: MEDICARE

## 2025-02-03 ENCOUNTER — HOSPITAL ENCOUNTER (OUTPATIENT)
Facility: HOSPITAL | Age: 75
Discharge: HOME | End: 2025-02-03
Payer: MEDICARE

## 2025-02-03 LAB
RAD ONC ARIA COURSE ID: 1
RAD ONC ARIA COURSE INTENT: NORMAL
RAD ONC ARIA COURSE LAST TREATMENT DATE: NORMAL
RAD ONC ARIA COURSE START DATE: NORMAL
RAD ONC ARIA COURSE TREATMENT ELAPSED DAYS: 26
RAD ONC ARIA FIRST TREATMENT DATE: NORMAL
RAD ONC ARIA PLAN FRACTIONS TREATED TO DATE: 19
RAD ONC ARIA PLAN ID: NORMAL
RAD ONC ARIA PLAN NAME: NORMAL
RAD ONC ARIA PLAN PRESCRIBED DOSE PER FRACTION: 2.5 GY
RAD ONC ARIA PLAN PRIMARY REFERENCE POINT: NORMAL
RAD ONC ARIA PLAN TOTAL FRACTIONS PRESCRIBED: 28
RAD ONC ARIA PLAN TOTAL PRESCRIBED DOSE: 7000 CGY
RAD ONC ARIA REFERENCE POINT DOSAGE GIVEN TO DATE: 34.2 GY
RAD ONC ARIA REFERENCE POINT DOSAGE GIVEN TO DATE: 47.5 GY
RAD ONC ARIA REFERENCE POINT ID: NORMAL
RAD ONC ARIA REFERENCE POINT ID: NORMAL
RAD ONC ARIA REFERENCE POINT SESSION DOSAGE GIVEN: 1.8 GY
RAD ONC ARIA REFERENCE POINT SESSION DOSAGE GIVEN: 2.5 GY

## 2025-02-03 PROCEDURE — 77385: CPT | Performed by: RADIOLOGY

## 2025-02-04 ENCOUNTER — HOSPITAL ENCOUNTER (OUTPATIENT)
Facility: HOSPITAL | Age: 75
Discharge: HOME | End: 2025-02-04

## 2025-02-04 LAB
RAD ONC ARIA COURSE ID: 1
RAD ONC ARIA COURSE INTENT: NORMAL
RAD ONC ARIA COURSE LAST TREATMENT DATE: NORMAL
RAD ONC ARIA COURSE START DATE: NORMAL
RAD ONC ARIA COURSE TREATMENT ELAPSED DAYS: 27
RAD ONC ARIA FIRST TREATMENT DATE: NORMAL
RAD ONC ARIA PLAN FRACTIONS TREATED TO DATE: 20
RAD ONC ARIA PLAN ID: NORMAL
RAD ONC ARIA PLAN NAME: NORMAL
RAD ONC ARIA PLAN PRESCRIBED DOSE PER FRACTION: 2.5 GY
RAD ONC ARIA PLAN PRIMARY REFERENCE POINT: NORMAL
RAD ONC ARIA PLAN TOTAL FRACTIONS PRESCRIBED: 28
RAD ONC ARIA PLAN TOTAL PRESCRIBED DOSE: 7000 CGY
RAD ONC ARIA REFERENCE POINT DOSAGE GIVEN TO DATE: 36 GY
RAD ONC ARIA REFERENCE POINT DOSAGE GIVEN TO DATE: 50 GY
RAD ONC ARIA REFERENCE POINT ID: NORMAL
RAD ONC ARIA REFERENCE POINT ID: NORMAL
RAD ONC ARIA REFERENCE POINT SESSION DOSAGE GIVEN: 1.8 GY
RAD ONC ARIA REFERENCE POINT SESSION DOSAGE GIVEN: 2.5 GY

## 2025-02-04 PROCEDURE — 77385: CPT | Performed by: RADIOLOGY

## 2025-02-04 PROCEDURE — 77336 RADIATION PHYSICS CONSULT: CPT | Performed by: RADIOLOGY

## 2025-02-05 ENCOUNTER — HOSPITAL ENCOUNTER (OUTPATIENT)
Facility: HOSPITAL | Age: 75
Discharge: HOME | End: 2025-02-05

## 2025-02-05 LAB
RAD ONC ARIA COURSE ID: 1
RAD ONC ARIA COURSE INTENT: NORMAL
RAD ONC ARIA COURSE LAST TREATMENT DATE: NORMAL
RAD ONC ARIA COURSE START DATE: NORMAL
RAD ONC ARIA COURSE TREATMENT ELAPSED DAYS: 28
RAD ONC ARIA FIRST TREATMENT DATE: NORMAL
RAD ONC ARIA PLAN FRACTIONS TREATED TO DATE: 21
RAD ONC ARIA PLAN ID: NORMAL
RAD ONC ARIA PLAN NAME: NORMAL
RAD ONC ARIA PLAN PRESCRIBED DOSE PER FRACTION: 2.5 GY
RAD ONC ARIA PLAN PRIMARY REFERENCE POINT: NORMAL
RAD ONC ARIA PLAN TOTAL FRACTIONS PRESCRIBED: 28
RAD ONC ARIA PLAN TOTAL PRESCRIBED DOSE: 7000 CGY
RAD ONC ARIA REFERENCE POINT DOSAGE GIVEN TO DATE: 37.8 GY
RAD ONC ARIA REFERENCE POINT DOSAGE GIVEN TO DATE: 52.5 GY
RAD ONC ARIA REFERENCE POINT ID: NORMAL
RAD ONC ARIA REFERENCE POINT ID: NORMAL
RAD ONC ARIA REFERENCE POINT SESSION DOSAGE GIVEN: 1.8 GY
RAD ONC ARIA REFERENCE POINT SESSION DOSAGE GIVEN: 2.5 GY

## 2025-02-05 PROCEDURE — 77385: CPT | Performed by: RADIOLOGY

## 2025-02-06 ENCOUNTER — HOSPITAL ENCOUNTER (OUTPATIENT)
Facility: HOSPITAL | Age: 75
Discharge: HOME | End: 2025-02-06

## 2025-02-06 ENCOUNTER — DOCUMENTATION (OUTPATIENT)
Dept: NUTRITION | Facility: HOSPITAL | Age: 75
End: 2025-02-06
Payer: MEDICARE

## 2025-02-06 VITALS — BODY MASS INDEX: 31.32 KG/M2 | WEIGHT: 231 LBS

## 2025-02-06 LAB
RAD ONC ARIA COURSE ID: 1
RAD ONC ARIA COURSE INTENT: NORMAL
RAD ONC ARIA COURSE LAST TREATMENT DATE: NORMAL
RAD ONC ARIA COURSE START DATE: NORMAL
RAD ONC ARIA COURSE TREATMENT ELAPSED DAYS: 29
RAD ONC ARIA FIRST TREATMENT DATE: NORMAL
RAD ONC ARIA PLAN FRACTIONS TREATED TO DATE: 22
RAD ONC ARIA PLAN ID: NORMAL
RAD ONC ARIA PLAN NAME: NORMAL
RAD ONC ARIA PLAN PRESCRIBED DOSE PER FRACTION: 2.5 GY
RAD ONC ARIA PLAN PRIMARY REFERENCE POINT: NORMAL
RAD ONC ARIA PLAN TOTAL FRACTIONS PRESCRIBED: 28
RAD ONC ARIA PLAN TOTAL PRESCRIBED DOSE: 7000 CGY
RAD ONC ARIA REFERENCE POINT DOSAGE GIVEN TO DATE: 39.6 GY
RAD ONC ARIA REFERENCE POINT DOSAGE GIVEN TO DATE: 55 GY
RAD ONC ARIA REFERENCE POINT ID: NORMAL
RAD ONC ARIA REFERENCE POINT ID: NORMAL
RAD ONC ARIA REFERENCE POINT SESSION DOSAGE GIVEN: 1.8 GY
RAD ONC ARIA REFERENCE POINT SESSION DOSAGE GIVEN: 2.5 GY

## 2025-02-06 PROCEDURE — 77385: CPT | Performed by: RADIOLOGY

## 2025-02-06 NOTE — PROGRESS NOTES
ONC Nutrition     Diagnosis: Prostate Cancer      Radiation: The prostate gland and seminal vesicles and PET positive left iliac lymph nodes will receive a dose of 70 Wilkerson delivered over 5 and half weeks. The remainder of the pelvic lymphatics will receive concurrent dose of 50.4 Gray      Weight:  1/9/25- 233#   1/16/25- 232#  1/23/25- 229#  2/6/25- 231#     Patient completed 22/28 treatments today.       Patient reports he has had a few episodes of diarrhea but it hasn't been bad enough to take any imodium. Patient reports his energy is still normal. RD encouraged patient to call if the diarrhea gets any worse over the next few days.      All questions/concerns addressed at this time.      Will follow as indicated.      Estephanie Caputo, VIKAS, LD

## 2025-02-07 ENCOUNTER — HOSPITAL ENCOUNTER (OUTPATIENT)
Facility: HOSPITAL | Age: 75
Discharge: HOME | End: 2025-02-07

## 2025-02-07 LAB
RAD ONC ARIA COURSE ID: 1
RAD ONC ARIA COURSE INTENT: NORMAL
RAD ONC ARIA COURSE LAST TREATMENT DATE: NORMAL
RAD ONC ARIA COURSE START DATE: NORMAL
RAD ONC ARIA COURSE TREATMENT ELAPSED DAYS: 30
RAD ONC ARIA FIRST TREATMENT DATE: NORMAL
RAD ONC ARIA PLAN FRACTIONS TREATED TO DATE: 23
RAD ONC ARIA PLAN ID: NORMAL
RAD ONC ARIA PLAN NAME: NORMAL
RAD ONC ARIA PLAN PRESCRIBED DOSE PER FRACTION: 2.5 GY
RAD ONC ARIA PLAN PRIMARY REFERENCE POINT: NORMAL
RAD ONC ARIA PLAN TOTAL FRACTIONS PRESCRIBED: 28
RAD ONC ARIA PLAN TOTAL PRESCRIBED DOSE: 7000 CGY
RAD ONC ARIA REFERENCE POINT DOSAGE GIVEN TO DATE: 41.4 GY
RAD ONC ARIA REFERENCE POINT DOSAGE GIVEN TO DATE: 57.5 GY
RAD ONC ARIA REFERENCE POINT ID: NORMAL
RAD ONC ARIA REFERENCE POINT ID: NORMAL
RAD ONC ARIA REFERENCE POINT SESSION DOSAGE GIVEN: 1.8 GY
RAD ONC ARIA REFERENCE POINT SESSION DOSAGE GIVEN: 2.5 GY

## 2025-02-07 PROCEDURE — 77385: CPT | Performed by: RADIOLOGY

## 2025-02-10 ENCOUNTER — HOSPITAL ENCOUNTER (OUTPATIENT)
Facility: HOSPITAL | Age: 75
Discharge: HOME | End: 2025-02-10
Payer: MEDICARE

## 2025-02-10 LAB
RAD ONC ARIA COURSE ID: 1
RAD ONC ARIA COURSE INTENT: NORMAL
RAD ONC ARIA COURSE LAST TREATMENT DATE: NORMAL
RAD ONC ARIA COURSE START DATE: NORMAL
RAD ONC ARIA COURSE TREATMENT ELAPSED DAYS: 33
RAD ONC ARIA FIRST TREATMENT DATE: NORMAL
RAD ONC ARIA PLAN FRACTIONS TREATED TO DATE: 24
RAD ONC ARIA PLAN ID: NORMAL
RAD ONC ARIA PLAN NAME: NORMAL
RAD ONC ARIA PLAN PRESCRIBED DOSE PER FRACTION: 2.5 GY
RAD ONC ARIA PLAN PRIMARY REFERENCE POINT: NORMAL
RAD ONC ARIA PLAN TOTAL FRACTIONS PRESCRIBED: 28
RAD ONC ARIA PLAN TOTAL PRESCRIBED DOSE: 7000 CGY
RAD ONC ARIA REFERENCE POINT DOSAGE GIVEN TO DATE: 43.2 GY
RAD ONC ARIA REFERENCE POINT DOSAGE GIVEN TO DATE: 60 GY
RAD ONC ARIA REFERENCE POINT ID: NORMAL
RAD ONC ARIA REFERENCE POINT ID: NORMAL
RAD ONC ARIA REFERENCE POINT SESSION DOSAGE GIVEN: 1.8 GY
RAD ONC ARIA REFERENCE POINT SESSION DOSAGE GIVEN: 2.5 GY

## 2025-02-10 PROCEDURE — 77385: CPT | Performed by: RADIOLOGY

## 2025-02-12 ENCOUNTER — HOSPITAL ENCOUNTER (OUTPATIENT)
Facility: HOSPITAL | Age: 75
Discharge: HOME | End: 2025-02-12

## 2025-02-12 LAB
RAD ONC ARIA COURSE ID: 1
RAD ONC ARIA COURSE INTENT: NORMAL
RAD ONC ARIA COURSE LAST TREATMENT DATE: NORMAL
RAD ONC ARIA COURSE START DATE: NORMAL
RAD ONC ARIA COURSE TREATMENT ELAPSED DAYS: 35
RAD ONC ARIA FIRST TREATMENT DATE: NORMAL
RAD ONC ARIA PLAN FRACTIONS TREATED TO DATE: 25
RAD ONC ARIA PLAN ID: NORMAL
RAD ONC ARIA PLAN NAME: NORMAL
RAD ONC ARIA PLAN PRESCRIBED DOSE PER FRACTION: 2.5 GY
RAD ONC ARIA PLAN PRIMARY REFERENCE POINT: NORMAL
RAD ONC ARIA PLAN TOTAL FRACTIONS PRESCRIBED: 28
RAD ONC ARIA PLAN TOTAL PRESCRIBED DOSE: 7000 CGY
RAD ONC ARIA REFERENCE POINT DOSAGE GIVEN TO DATE: 45 GY
RAD ONC ARIA REFERENCE POINT DOSAGE GIVEN TO DATE: 62.5 GY
RAD ONC ARIA REFERENCE POINT ID: NORMAL
RAD ONC ARIA REFERENCE POINT ID: NORMAL
RAD ONC ARIA REFERENCE POINT SESSION DOSAGE GIVEN: 1.8 GY
RAD ONC ARIA REFERENCE POINT SESSION DOSAGE GIVEN: 2.5 GY

## 2025-02-12 PROCEDURE — 77336 RADIATION PHYSICS CONSULT: CPT | Performed by: RADIOLOGY

## 2025-02-12 PROCEDURE — 77385: CPT | Performed by: RADIOLOGY

## 2025-02-13 ENCOUNTER — HOSPITAL ENCOUNTER (OUTPATIENT)
Facility: HOSPITAL | Age: 75
Discharge: HOME | End: 2025-02-13

## 2025-02-13 ENCOUNTER — DOCUMENTATION (OUTPATIENT)
Dept: NUTRITION | Facility: HOSPITAL | Age: 75
End: 2025-02-13
Payer: MEDICARE

## 2025-02-13 VITALS — BODY MASS INDEX: 31.46 KG/M2 | WEIGHT: 232 LBS

## 2025-02-13 LAB
RAD ONC ARIA COURSE ID: 1
RAD ONC ARIA COURSE INTENT: NORMAL
RAD ONC ARIA COURSE LAST TREATMENT DATE: NORMAL
RAD ONC ARIA COURSE START DATE: NORMAL
RAD ONC ARIA COURSE TREATMENT ELAPSED DAYS: 36
RAD ONC ARIA FIRST TREATMENT DATE: NORMAL
RAD ONC ARIA PLAN FRACTIONS TREATED TO DATE: 26
RAD ONC ARIA PLAN ID: NORMAL
RAD ONC ARIA PLAN NAME: NORMAL
RAD ONC ARIA PLAN PRESCRIBED DOSE PER FRACTION: 2.5 GY
RAD ONC ARIA PLAN PRIMARY REFERENCE POINT: NORMAL
RAD ONC ARIA PLAN TOTAL FRACTIONS PRESCRIBED: 28
RAD ONC ARIA PLAN TOTAL PRESCRIBED DOSE: 7000 CGY
RAD ONC ARIA REFERENCE POINT DOSAGE GIVEN TO DATE: 46.8 GY
RAD ONC ARIA REFERENCE POINT DOSAGE GIVEN TO DATE: 65 GY
RAD ONC ARIA REFERENCE POINT ID: NORMAL
RAD ONC ARIA REFERENCE POINT ID: NORMAL
RAD ONC ARIA REFERENCE POINT SESSION DOSAGE GIVEN: 1.8 GY
RAD ONC ARIA REFERENCE POINT SESSION DOSAGE GIVEN: 2.5 GY

## 2025-02-13 PROCEDURE — 77385: CPT | Performed by: RADIOLOGY

## 2025-02-13 NOTE — PROGRESS NOTES
ONC Nutrition     Diagnosis: Prostate Cancer      Radiation: The prostate gland and seminal vesicles and PET positive left iliac lymph nodes will receive a dose of 70 Wilkerson delivered over 5 and half weeks. The remainder of the pelvic lymphatics will receive concurrent dose of 50.4 Gray      Weight:  1/9/25- 233#   1/16/25- 232#  1/23/25- 229#  2/6/25- 231#  2/13/25- 232#     Patient completed 26/28 treatments today.       Patient reports he had an episode of diarrhea earlier in the week but only took a half of imodium and he hasn't had any trouble since. Patient does report increased fatigue this week as well. Patient reports he's eating, smaller more frequent meals but hasn't gone back to the gym in a while.      All questions/concerns addressed at this time.      Will follow as indicated.      Estephanie Caputo, RD, LD

## 2025-02-14 ENCOUNTER — HOSPITAL ENCOUNTER (OUTPATIENT)
Facility: HOSPITAL | Age: 75
Discharge: HOME | End: 2025-02-14

## 2025-02-14 LAB
RAD ONC ARIA COURSE ID: 1
RAD ONC ARIA COURSE INTENT: NORMAL
RAD ONC ARIA COURSE LAST TREATMENT DATE: NORMAL
RAD ONC ARIA COURSE START DATE: NORMAL
RAD ONC ARIA COURSE TREATMENT ELAPSED DAYS: 37
RAD ONC ARIA FIRST TREATMENT DATE: NORMAL
RAD ONC ARIA PLAN FRACTIONS TREATED TO DATE: 27
RAD ONC ARIA PLAN ID: NORMAL
RAD ONC ARIA PLAN NAME: NORMAL
RAD ONC ARIA PLAN PRESCRIBED DOSE PER FRACTION: 2.5 GY
RAD ONC ARIA PLAN PRIMARY REFERENCE POINT: NORMAL
RAD ONC ARIA PLAN TOTAL FRACTIONS PRESCRIBED: 28
RAD ONC ARIA PLAN TOTAL PRESCRIBED DOSE: 7000 CGY
RAD ONC ARIA REFERENCE POINT DOSAGE GIVEN TO DATE: 48.6 GY
RAD ONC ARIA REFERENCE POINT DOSAGE GIVEN TO DATE: 67.5 GY
RAD ONC ARIA REFERENCE POINT ID: NORMAL
RAD ONC ARIA REFERENCE POINT ID: NORMAL
RAD ONC ARIA REFERENCE POINT SESSION DOSAGE GIVEN: 1.8 GY
RAD ONC ARIA REFERENCE POINT SESSION DOSAGE GIVEN: 2.5 GY

## 2025-02-14 PROCEDURE — 77385: CPT | Performed by: RADIOLOGY

## 2025-02-17 ENCOUNTER — HOSPITAL ENCOUNTER (OUTPATIENT)
Facility: HOSPITAL | Age: 75
Discharge: HOME | End: 2025-02-17
Payer: MEDICARE

## 2025-02-17 LAB
RAD ONC ARIA COURSE ID: 1
RAD ONC ARIA COURSE INTENT: NORMAL
RAD ONC ARIA COURSE LAST TREATMENT DATE: NORMAL
RAD ONC ARIA COURSE START DATE: NORMAL
RAD ONC ARIA COURSE TREATMENT ELAPSED DAYS: 40
RAD ONC ARIA FIRST TREATMENT DATE: NORMAL
RAD ONC ARIA PLAN FRACTIONS TREATED TO DATE: 28
RAD ONC ARIA PLAN ID: NORMAL
RAD ONC ARIA PLAN NAME: NORMAL
RAD ONC ARIA PLAN PRESCRIBED DOSE PER FRACTION: 2.5 GY
RAD ONC ARIA PLAN PRIMARY REFERENCE POINT: NORMAL
RAD ONC ARIA PLAN TOTAL FRACTIONS PRESCRIBED: 28
RAD ONC ARIA PLAN TOTAL PRESCRIBED DOSE: 7000 CGY
RAD ONC ARIA REFERENCE POINT DOSAGE GIVEN TO DATE: 50.4 GY
RAD ONC ARIA REFERENCE POINT DOSAGE GIVEN TO DATE: 70 GY
RAD ONC ARIA REFERENCE POINT ID: NORMAL
RAD ONC ARIA REFERENCE POINT ID: NORMAL
RAD ONC ARIA REFERENCE POINT SESSION DOSAGE GIVEN: 1.8 GY
RAD ONC ARIA REFERENCE POINT SESSION DOSAGE GIVEN: 2.5 GY

## 2025-02-17 PROCEDURE — 77385: CPT | Performed by: RADIOLOGY

## 2025-02-17 PROCEDURE — 77336 RADIATION PHYSICS CONSULT: CPT | Performed by: RADIOLOGY

## 2025-03-14 ENCOUNTER — CLINICAL SUPPORT (OUTPATIENT)
Dept: RADIATION ONCOLOGY | Facility: HOSPITAL | Age: 75
End: 2025-03-14
Payer: MEDICARE

## 2025-03-14 ENCOUNTER — HOSPITAL ENCOUNTER (OUTPATIENT)
Dept: RADIATION ONCOLOGY | Facility: HOSPITAL | Age: 75
Setting detail: RADIATION/ONCOLOGY SERIES
Discharge: HOME | End: 2025-03-14
Payer: MEDICARE

## 2025-03-14 DIAGNOSIS — C61 PROSTATE CANCER: Primary | ICD-10-CM

## 2025-03-14 NOTE — PROGRESS NOTES
TELEMEDICINE FOLLOW UP NOTE    PATIENT:                                                      Girish Moon  MEDICAL RECORD #:                        [id number]  :                                                          1950  COMPLETION DATE:   2025  DIAGNOSIS:     Prostate cancer  - Stage IIIC (cT2c, cN0, cM0, PSA: 29.6, Grade Group: 5)  - Stage KAROL (cT3b, cN1, cM0, PSA: 29.6, Grade Group: 5)      This visit has been converted to a telehealth virtual visit, the patient's preferred method for today's follow-up.  Total time of discussion was 24 minutes.  The patient has given verbal consent.      BRIEF HISTORY:    Initial follow up visit for high risk prostate cancer.  He was initially felt to have clinically localized disease, as conventional staging including MRI and CT abdomen pelvis showed no obvious extraprostatic spread, no pathologic adenopathy, and no abnormality involving the seminal vesicles.    Nuclear medicine bone scan showed no evidence of metastasis.  He was initiated on androgen ablation with bicalutamide, and sent for consideration of CyberKnife SBRT.  However, further work-up with PSMA PET/CT revealed uptake in the proximal seminal vesicles (SUV 25.2), consistent with direct extension into the seminal vesicles.  Additionally, 2 small subcentimeter left iliac lymph nodes appeared hypermetabolic (SUV 8.9), consistent with regional lymph node metastasis.  Expectedly, there was hypermetabolism (SUV 53.6) in the periphery of the left side of the prostate at the apex and mid body.  Given these findings, definitive treatment plan appropriately shifted to moderate hypofractionated pelvic irradiation with concurrent androgen ablation.  The patient received his first 6-month LHRH agonist injection of Lupron on 2024.  He then completed a course of IMRT on 2025.  The prostate gland and seminal vesicles and PET positive left iliac lymph nodes were treated to a dose of 70 Gray in daily 28  fractions.  Concurrently, the remainder of the pelvic lymphatics received a minimum dose of 50.4 Gray using SIB technique.  He tolerated treatment well.  He did experience mild exacerbation of urinary irritative/outflow obstructive symptoms, namely dysuria which has resolved and increased urinary frequency/urgency which continue to kostas.  He also reports urinary stream is gradually getting stronger.  He denies gross hematuria.  He denies urinary incontinence.  He experienced some orthostatic hypotension and syncopal episode felt to potentially relate to Tamsulosin.  He was prescribed a more selective alpha blocker with Alfuzosin, but he elected to discontinue this after one dose.  He has not had any further issues with blood pressure or feeling faint.  He denies GI side effects, hematochezia, or change in bowel function.  He reports mild, tolerable hot flashes which he relates to androgen ablation.  Since discontinuing bicalutamide, gynecomastia has subsided.  Energy level is improving.  No new aches or pains.  Overall, he feels well and has returned going to the gym.          MEDICATIONS: Medication reconciliation for the patient was reviewed and confirmed in the electronic medical record.    Review of Systems   Constitutional:  Positive for fatigue.   HENT:   Positive for hearing loss.    Endocrine: Positive for hot flashes.   Genitourinary:  Positive for frequency and nocturia.    Musculoskeletal:  Positive for arthralgias.   Psychiatric/Behavioral:  Positive for sleep disturbance (2/2 nocturia).    All other systems reviewed and are negative.      KPS 90%      Physical Exam  Pulmonary:      Respirations even, unlabored. No audible wheezing or cough.  Neurological:      A+Ox4, conversant, answers questions appropriately.  Psychiatric:     Judgement, affect, and decision-making WNL.    Limited physical exam as visit was conducted remotely via telephone.          The following portions of the patient's history  were reviewed and updated as appropriate: allergies, current medications, past family history, past medical history, past social history, past surgical history and problem list.         Diagnoses and all orders for this visit:    1. Prostate cancer (Primary)  -     NM Pet Skull Base To Mid Thigh; Future         IMPRESSION:   Prostate cancer, Meeker's 5+4 = 9, clinical stage increased to KAROL (T3b, N1, M0) based on PSMA PET scan.  He is tolerating initiation of neoadjuvant and concurrent androgen ablation.  1 month status post pelvic IMRT.  He tolerated treatment well.    Alpha blockers were poorly tolerated during treatment and have since been discontinued secondary to hypotensive episodes.  At this point, acute grade 1 LUTS continue to appropriately kostas and he has had no further issues with his blood pressure.    He continues adjuvant androgen ablation, possibly up to a couple years.    His next 6-month Lupron injection with Dr. Murphy will be due in June.  In the interim, the patient will return to Dr. Murphy as scheduled next month.  The patient and I reviewed role of surveillance with serial PSA monitoring, coordinated through the urology clinic.  I will order repeat PSMA PET/CT scan to evaluate treatment response.  Previously, there was no evidence of metastasis seen on conventional imaging studies including MRI, CT abdomen pelvis, or nuclear medicine bone scan.  However, PSMA PET prior to treatment showed evidence of seminal vesicle invasion and extraprostatic regional lymphatic metastasis.         Sarai and I have reviewed the survivorship care plan in detail.  We discussed diagnosis, follow-up intervals, serial PSA monitoring, and expectations for response to treatment.  A copy of the care plan has been mailed to the patient.  A copy has also been sent to his PCP.    RECOMMENDATIONS:  Continue adjuvant androgen deprivation and routine urologic surveillance under the care of Dr. Murphy who will also be  coordinating serial PSA monitoring.  Return to clinic in 3 months with repeat PSMA PET prior.         Return in about 3 months (around 6/17/2025) for Office Visit, Imaging - See orders.    CRISTIANO Cuellar      I spent a total of 50 minutes on today's visit, with more than 24 minutes in virtual communication with the patient via telephone, and the remainder of the time spent in reviewing the relevant history, records, available imaging, and for documentation.

## 2025-04-04 NOTE — PROGRESS NOTES
RADIATION ONCOLOGY COMPLETION NOTE    PATIENT:   Girish Moon  MEDICAL RECORD:  [id number]  :    1950  COMPLETION DATE: 2025  DIAGNOSIS:    Prostate Cancer  Initial Clinical Stage IIIC (cT2c, cN0, cM0, PSA: 29.6, Grade Group: 5)  Final Clinical Stage KAROL (cT3b, cN1, cM0, PSA: 29.6, Grade Group: 5)      BRIEF HISTORY:  This 75 y.o. patient completed radiotherapy.  He presented with high grade, high risk prostate cancer.  He initiated androgen ablation with bicalutamide in 2024.  PSMA PET/CT evaluation was performed 10/30/2024.  This showed expected hypermetabolism in the periphery of the left side of the prostate at the apex and mid body with SUV 53.6.  There is also uptake in the proximal seminal vesicles, SUV 25.2, consistent with direct extension into the seminal vesicles.  There were also 2 small subcentimeter left iliac lymph nodes, SUV 8.9 consistent with regional lymph node metastasis.      TREATMENT COURSE:  The prostate gland, seminal vesicles and PET positive pelvic lymph nodes received a dose of 70 Gy delivered in 28 fractions of 2.5 Gy with 10 MV photons, VMAT intensity modulated radiotherapy technique and image guidance.  Concurrently, using simultaneous boost, the uninvolved pelvic lymphatics received a minimum dose of 50.4 Gy.    DATES OF TREATMENT:  2025 through 2025    TOLERANCE:   no unexpected difficulties     STATUS:  too early to determine response    DISPOSITION:  Follow up in Radiation Oncology in approximately 1 month.        Walker Coley MD

## 2025-04-07 LAB
RAD ONC ARIA COURSE END DATE: NORMAL
RAD ONC ARIA COURSE ID: 1
RAD ONC ARIA COURSE INTENT: NORMAL
RAD ONC ARIA COURSE LAST TREATMENT DATE: NORMAL
RAD ONC ARIA COURSE START DATE: NORMAL
RAD ONC ARIA COURSE TREATMENT ELAPSED DAYS: 40
RAD ONC ARIA FIRST TREATMENT DATE: NORMAL
RAD ONC ARIA PLAN FRACTIONS TREATED TO DATE: 28
RAD ONC ARIA PLAN ID: NORMAL
RAD ONC ARIA PLAN NAME: NORMAL
RAD ONC ARIA PLAN PRESCRIBED DOSE PER FRACTION: 2.5 GY
RAD ONC ARIA PLAN PRIMARY REFERENCE POINT: NORMAL
RAD ONC ARIA PLAN TOTAL FRACTIONS PRESCRIBED: 28
RAD ONC ARIA PLAN TOTAL PRESCRIBED DOSE: 7000 CGY
RAD ONC ARIA REFERENCE POINT DOSAGE GIVEN TO DATE: 50.4 GY
RAD ONC ARIA REFERENCE POINT DOSAGE GIVEN TO DATE: 70 GY
RAD ONC ARIA REFERENCE POINT ID: NORMAL
RAD ONC ARIA REFERENCE POINT ID: NORMAL

## 2025-04-17 ENCOUNTER — OFFICE VISIT (OUTPATIENT)
Dept: UROLOGY | Facility: CLINIC | Age: 75
End: 2025-04-17
Payer: MEDICARE

## 2025-04-17 VITALS
SYSTOLIC BLOOD PRESSURE: 110 MMHG | HEIGHT: 68 IN | BODY MASS INDEX: 35.08 KG/M2 | DIASTOLIC BLOOD PRESSURE: 60 MMHG | WEIGHT: 231.48 LBS | HEART RATE: 60 BPM

## 2025-04-17 DIAGNOSIS — C61 PROSTATE CANCER: Primary | ICD-10-CM

## 2025-04-17 PROCEDURE — 84153 ASSAY OF PSA TOTAL: CPT | Performed by: UROLOGY

## 2025-04-17 NOTE — PROGRESS NOTES
Chief Complaint:      Chief Complaint   Patient presents with    Prostate Cancer       HPI:   75 y.o. male he completed a round of CyberKnife.  He still has low energy he is urinating really well.  He had Lupron before the radiation he reports no lower urinary tract symptomatology, particularly irritative symptoms such as frequency, urgency, dysuria, and obstructive symptomatology, particularly dribbling, hesitancy, and intermittency.  No bright red blood per rectum does not need refills he could not tolerate the Arimidex from for the breast tenderness but is slowly resolving he has a PSA pending I will see him back in 6 months    Past Medical History:     Past Medical History:   Diagnosis Date    History of radiation therapy 02/17/2025    pelvic IMRT    Prostate cancer     Prostate cancer        Current Meds:     Current Outpatient Medications   Medication Sig Dispense Refill    ciprofloxacin (Cipro) 500 MG tablet Take one tablet twice a day before procedure 4 tablet 0    diazePAM (VALIUM) 10 MG tablet One tablet night before procedure at bedtime and one morning of one hour prior to procedure 2 tablet 0    Sodium Phosphates ([employer] Enema Disposable) 19-7 GM/118ML enema Use morning of the procedure 1 enema 0     No current facility-administered medications for this visit.        Allergies:      No Known Allergies     Past Surgical History:     Past Surgical History:   Procedure Laterality Date    KNEE SURGERY Left     PROSTATE BIOPSY      PROSTATE BIOPSY      VARICOSE VEIN SURGERY      VEIN SURGERY         Social History:     Social History     Socioeconomic History    Marital status:    Tobacco Use    Smoking status: Never     Passive exposure: Never    Smokeless tobacco: Never   Vaping Use    Vaping status: Never Used   Substance and Sexual Activity    Alcohol use: Defer     Comment: 18 beers per week    Drug use: Defer    Sexual activity: Defer       Family History:     Family History   Problem Relation Age  of Onset    Stroke Father     Arthritis Mother        Review of Systems:     Review of Systems   Constitutional: Negative.    HENT: Negative.     Eyes: Negative.    Respiratory: Negative.     Cardiovascular: Negative.    Gastrointestinal: Negative.    Endocrine: Negative.    Musculoskeletal: Negative.    Allergic/Immunologic: Negative.    Neurological: Negative.    Hematological: Negative.    Psychiatric/Behavioral: Negative.         Physical Exam:     Physical Exam  Vitals and nursing note reviewed.   Constitutional:       Appearance: He is well-developed.   HENT:      Head: Normocephalic and atraumatic.   Eyes:      Conjunctiva/sclera: Conjunctivae normal.      Pupils: Pupils are equal, round, and reactive to light.   Cardiovascular:      Rate and Rhythm: Normal rate and regular rhythm.      Heart sounds: Normal heart sounds.   Pulmonary:      Effort: Pulmonary effort is normal.      Breath sounds: Normal breath sounds.   Abdominal:      General: Bowel sounds are normal.      Palpations: Abdomen is soft.   Musculoskeletal:         General: Normal range of motion.      Cervical back: Normal range of motion.   Skin:     General: Skin is warm and dry.   Neurological:      Mental Status: He is alert and oriented to person, place, and time.      Deep Tendon Reflexes: Reflexes are normal and symmetric.   Psychiatric:         Behavior: Behavior normal.         Thought Content: Thought content normal.         Judgment: Judgment normal.         I have reviewed the following portions of the patient's history: Allergies, current medications, past family history, past medical history, past social history, past surgical history, problem list, and ROS and confirm it is accurate.    Recent Image (CT and/or KUB):      CT Abdomen and Pelvis: Results for orders placed during the hospital encounter of 09/16/24    CT Abdomen Pelvis With & Without Contrast    Narrative  EXAM:  CT Abdomen and Pelvis Without and With Intravenous  Contrast    EXAM DATE:  9/16/2024 10:45 AM    CLINICAL HISTORY:  prostate cancer; Z12.5-Encounter for screening for malignant neoplasm  of prostate    TECHNIQUE:  Axial computed tomography images of the abdomen and pelvis without and  with intravenous contrast.  Sagittal and coronal reformatted images were  created and reviewed.  This CT exam was performed using one or more of  the following dose reduction techniques:  automated exposure control,  adjustment of the mA and/or kV according to patient size, and/or use of  iterative reconstruction technique.    COMPARISON:  No relevant prior studies available.    FINDINGS:  Lung bases:  Tiny nodule right lower lobe likely granuloma. 12-month  follow-up. 4.4 mm.  Lung bases otherwise clear.  No consolidation.  Mediastinum:  Small hiatal hernia.    ABDOMEN:  Liver:  Unremarkable as visualized.  No mass.  Gallbladder and bile ducts:  Unremarkable as visualized.  No calcified  stones.  No ductal dilation.  Pancreas:  Unremarkable as visualized.  No mass.  No ductal dilation.  Spleen:  Unremarkable as visualized.  No splenomegaly.  Adrenals:  Unremarkable as visualized.  No mass.  Kidneys and ureters:  Unremarkable as visualized.  No solid mass.  No  obstructing stones.  No hydronephrosis.  Stomach and bowel:  Mild sigmoid diverticulosis without  diverticulitis.  No obstruction.    PELVIS:  Appendix:  Normal appendix.  Bladder:  Urinary bladder wall thickening. Incomplete distention  versus cystitis versus chronic partial bladder outlet obstruction.  No  stones.  Reproductive:  Marked prostate enlargement.    ABDOMEN and PELVIS:  Intraperitoneal space:  Unremarkable as visualized.  No significant  fluid collection.  No pneumoperitoneum identified.  Bones/joints:  Focal sclerotic lesions left iliac bone.  Focal  sclerotic lesion right iliac bone.  Focal sclerosis left superior pubic  ramus.  Focal sclerosis left ischial aspect.  L5 pars defects with  minimal anterolisthesis  of L5 on S1 and severe neuroforaminal stenosis.  No acute fracture.  No dislocation.  Soft tissues:  Fat only containing umbilical hernia.  Vasculature:  Unremarkable as visualized.  No abdominal aortic  aneurysm.  Lymph nodes:  Unremarkable as visualized.  No retroperitoneal or iliac  adenopathy identified.  Other findings:  Bilateral left greater than right varicoceles are  noted.    Impression  1. Scattered sclerotic bone lesions which are nonspecific. Recommend  bone scan to assess for osteoblastic activity in light of patient  history.  2.  Marked prostate enlargement.  3.  Urinary bladder wall thickening. Incomplete distention versus  cystitis versus chronic partial bladder outlet obstruction.  4.  L5 pars defects with minimal anterolisthesis of L5 on S1 and severe  neuroforaminal stenosis.  5.  Mild sigmoid diverticulosis without diverticulitis.  6.  No retroperitoneal or iliac adenopathy identified. Other nonacute  findings above.      This report was finalized on 9/16/2024 11:25 AM by Dr. Yuniel Butler MD.       CT Stone Protocol: No results found for this or any previous visit.       KUB: No results found for this or any previous visit.       Labs (past 3 months):      Orders Only on 04/07/2025   Component Date Value Ref Range Status    Course ID 04/07/2025 1   Final    Course Intent 04/07/2025 Unknown   Final    Course Start Date 04/07/2025 11/5/2024  5:15 PM   Final    Course End Date 04/07/2025 4/7/2025  9:53 AM   Final    Course First Treatment Date 04/07/2025 1/8/2025 10:34 AM   Final    Course Last Treatment Date 04/07/2025 2/17/2025 10:53 AM   Final    Course Elapsed Days 04/07/2025 40   Final    Reference Point ID 04/07/2025 Prostate SIB   Final    Reference Point Dosage Given to Da* 04/07/2025 70  Gy Final    Reference Point ID 04/07/2025 PTV 50.4   Final    Reference Point Dosage Given to Da* 04/07/2025 50.4  Gy Final    Plan ID 04/07/2025 Prostate SIB   Final    Plan Name 04/07/2025 Prostate  SIB   Final    Plan Fractions Treated to Date 04/07/2025 28   Final    Plan Total Fractions Prescribed 04/07/2025 28   Final    Plan Prescribed Dose Per Fraction 04/07/2025 2.5  Gy Final    Plan Total Prescribed Dose 04/07/2025 7,000  cGy Final    Plan Primary Reference Point 04/07/2025 Prostate SIB   Final   Hospital Outpatient Visit on 02/17/2025   Component Date Value Ref Range Status    Course ID 02/17/2025 1   Final    Course Intent 02/17/2025 Unknown   Final    Course Start Date 02/17/2025 11/5/2024  5:15 PM   Final    Course First Treatment Date 02/17/2025 1/8/2025 10:34 AM   Final    Course Last Treatment Date 02/17/2025 2/17/2025 10:53 AM   Final    Course Elapsed Days 02/17/2025 40   Final    Reference Point ID 02/17/2025 Prostate SIB   Final    Reference Point Dosage Given to Da* 02/17/2025 70  Gy Final    Reference Point Session Dosage Giv* 02/17/2025 2.5  Gy Final    Reference Point ID 02/17/2025 PTV 50.4   Final    Reference Point Dosage Given to Da* 02/17/2025 50.4  Gy Final    Reference Point Session Dosage Giv* 02/17/2025 1.8  Gy Final    Plan ID 02/17/2025 Prostate SIB   Final    Plan Name 02/17/2025 Prostate SIB Final   Final    Plan Fractions Treated to Date 02/17/2025 28   Final    Plan Total Fractions Prescribed 02/17/2025 28   Final    Plan Prescribed Dose Per Fraction 02/17/2025 2.5  Gy Final    Plan Total Prescribed Dose 02/17/2025 7,000  cGy Final    Plan Primary Reference Point 02/17/2025 Prostate SIB   Final   Orders Only on 02/14/2025   Component Date Value Ref Range Status    Course ID 02/14/2025 1   Final    Course Intent 02/14/2025 Unknown   Final    Course Start Date 02/14/2025 11/5/2024  5:15 PM   Final    Course First Treatment Date 02/14/2025 1/8/2025 10:34 AM   Final    Course Last Treatment Date 02/14/2025 2/14/2025 10:50 AM   Final    Course Elapsed Days 02/14/2025 37   Final    Reference Point ID 02/14/2025 Prostate SIB   Final    Reference Point Dosage Given to Da* 02/14/2025  67.5  Gy Final    Reference Point Session Dosage Giv* 02/14/2025 2.5  Gy Final    Reference Point ID 02/14/2025 PTV 50.4   Final    Reference Point Dosage Given to Da* 02/14/2025 48.6  Gy Final    Reference Point Session Dosage Giv* 02/14/2025 1.8  Gy Final    Plan ID 02/14/2025 Prostate SIB   Final    Plan Name 02/14/2025 Prostate SIB Final   Final    Plan Fractions Treated to Date 02/14/2025 27   Final    Plan Total Fractions Prescribed 02/14/2025 28   Final    Plan Prescribed Dose Per Fraction 02/14/2025 2.5  Gy Final    Plan Total Prescribed Dose 02/14/2025 7,000  cGy Final    Plan Primary Reference Point 02/14/2025 Prostate SIB   Final   Orders Only on 02/13/2025   Component Date Value Ref Range Status    Course ID 02/13/2025 1   Final    Course Intent 02/13/2025 Unknown   Final    Course Start Date 02/13/2025 11/5/2024  5:15 PM   Final    Course First Treatment Date 02/13/2025 1/8/2025 10:34 AM   Final    Course Last Treatment Date 02/13/2025 2/13/2025 10:46 AM   Final    Course Elapsed Days 02/13/2025 36   Final    Reference Point ID 02/13/2025 Prostate SIB   Final    Reference Point Dosage Given to Da* 02/13/2025 65  Gy Final    Reference Point Session Dosage Giv* 02/13/2025 2.5  Gy Final    Reference Point ID 02/13/2025 PTV 50.4   Final    Reference Point Dosage Given to Da* 02/13/2025 46.8  Gy Final    Reference Point Session Dosage Giv* 02/13/2025 1.8  Gy Final    Plan ID 02/13/2025 Prostate SIB   Final    Plan Name 02/13/2025 Prostate SIB Final   Final    Plan Fractions Treated to Date 02/13/2025 26   Final    Plan Total Fractions Prescribed 02/13/2025 28   Final    Plan Prescribed Dose Per Fraction 02/13/2025 2.5  Gy Final    Plan Total Prescribed Dose 02/13/2025 7,000  cGy Final    Plan Primary Reference Point 02/13/2025 Prostate SIB   Final   Hospital Outpatient Visit on 02/12/2025   Component Date Value Ref Range Status    Course ID 02/12/2025 1   Final    Course Intent 02/12/2025 Unknown   Final     Course Start Date 02/12/2025 11/5/2024  5:15 PM   Final    Course First Treatment Date 02/12/2025 1/8/2025 10:34 AM   Final    Course Last Treatment Date 02/12/2025 2/12/2025 10:48 AM   Final    Course Elapsed Days 02/12/2025 35   Final    Reference Point ID 02/12/2025 Prostate SIB   Final    Reference Point Dosage Given to Da* 02/12/2025 62.5  Gy Final    Reference Point Session Dosage Giv* 02/12/2025 2.5  Gy Final    Reference Point ID 02/12/2025 PTV 50.4   Final    Reference Point Dosage Given to Da* 02/12/2025 45  Gy Final    Reference Point Session Dosage Giv* 02/12/2025 1.8  Gy Final    Plan ID 02/12/2025 Prostate SIB   Final    Plan Name 02/12/2025 Prostate SIB Final   Final    Plan Fractions Treated to Date 02/12/2025 25   Final    Plan Total Fractions Prescribed 02/12/2025 28   Final    Plan Prescribed Dose Per Fraction 02/12/2025 2.5  Gy Final    Plan Total Prescribed Dose 02/12/2025 7,000  cGy Final    Plan Primary Reference Point 02/12/2025 Prostate SIB   Final   Hospital Outpatient Visit on 02/10/2025   Component Date Value Ref Range Status    Course ID 02/10/2025 1   Final    Course Intent 02/10/2025 Unknown   Final    Course Start Date 02/10/2025 11/5/2024  5:15 PM   Final    Course First Treatment Date 02/10/2025 1/8/2025 10:34 AM   Final    Course Last Treatment Date 02/10/2025 2/10/2025 10:53 AM   Final    Course Elapsed Days 02/10/2025 33   Final    Reference Point ID 02/10/2025 Prostate SIB   Final    Reference Point Dosage Given to Da* 02/10/2025 60  Gy Final    Reference Point Session Dosage Giv* 02/10/2025 2.5  Gy Final    Reference Point ID 02/10/2025 PTV 50.4   Final    Reference Point Dosage Given to Da* 02/10/2025 43.2  Gy Final    Reference Point Session Dosage Giv* 02/10/2025 1.8  Gy Final    Plan ID 02/10/2025 Prostate SIB   Final    Plan Name 02/10/2025 Prostate SIB Final   Final    Plan Fractions Treated to Date 02/10/2025 24   Final    Plan Total Fractions Prescribed 02/10/2025 28    Final    Plan Prescribed Dose Per Fraction 02/10/2025 2.5  Gy Final    Plan Total Prescribed Dose 02/10/2025 7,000  cGy Final    Plan Primary Reference Point 02/10/2025 Prostate SIB   Final   Hospital Outpatient Visit on 02/07/2025   Component Date Value Ref Range Status    Course ID 02/07/2025 1   Final    Course Intent 02/07/2025 Unknown   Final    Course Start Date 02/07/2025 11/5/2024  5:15 PM   Final    Course First Treatment Date 02/07/2025 1/8/2025 10:34 AM   Final    Course Last Treatment Date 02/07/2025 2/7/2025 10:50 AM   Final    Course Elapsed Days 02/07/2025 30   Final    Reference Point ID 02/07/2025 Prostate SIB   Final    Reference Point Dosage Given to Da* 02/07/2025 57.5  Gy Final    Reference Point Session Dosage Giv* 02/07/2025 2.5  Gy Final    Reference Point ID 02/07/2025 PTV 50.4   Final    Reference Point Dosage Given to Da* 02/07/2025 41.4  Gy Final    Reference Point Session Dosage Giv* 02/07/2025 1.8  Gy Final    Plan ID 02/07/2025 Prostate SIB   Final    Plan Name 02/07/2025 Prostate SIB Final   Final    Plan Fractions Treated to Date 02/07/2025 23   Final    Plan Total Fractions Prescribed 02/07/2025 28   Final    Plan Prescribed Dose Per Fraction 02/07/2025 2.5  Gy Final    Plan Total Prescribed Dose 02/07/2025 7,000  cGy Final    Plan Primary Reference Point 02/07/2025 Prostate SIB   Final   Orders Only on 02/06/2025   Component Date Value Ref Range Status    Course ID 02/06/2025 1   Final    Course Intent 02/06/2025 Unknown   Final    Course Start Date 02/06/2025 11/5/2024  5:15 PM   Final    Course First Treatment Date 02/06/2025 1/8/2025 10:34 AM   Final    Course Last Treatment Date 02/06/2025 2/6/2025 10:51 AM   Final    Course Elapsed Days 02/06/2025 29   Final    Reference Point ID 02/06/2025 Prostate SIB   Final    Reference Point Dosage Given to Da* 02/06/2025 55  Gy Final    Reference Point Session Dosage Giv* 02/06/2025 2.5  Gy Final    Reference Point ID 02/06/2025 PTV 50.4    Final    Reference Point Dosage Given to Da* 02/06/2025 39.6  Gy Final    Reference Point Session Dosage Giv* 02/06/2025 1.8  Gy Final    Plan ID 02/06/2025 Prostate SIB   Final    Plan Name 02/06/2025 Prostate SIB Final   Final    Plan Fractions Treated to Date 02/06/2025 22   Final    Plan Total Fractions Prescribed 02/06/2025 28   Final    Plan Prescribed Dose Per Fraction 02/06/2025 2.5  Gy Final    Plan Total Prescribed Dose 02/06/2025 7,000  cGy Final    Plan Primary Reference Point 02/06/2025 Prostate SIB   Final   Hospital Outpatient Visit on 02/05/2025   Component Date Value Ref Range Status    Course ID 02/05/2025 1   Final    Course Intent 02/05/2025 Unknown   Final    Course Start Date 02/05/2025 11/5/2024  5:15 PM   Final    Course First Treatment Date 02/05/2025 1/8/2025 10:34 AM   Final    Course Last Treatment Date 02/05/2025 2/5/2025 10:52 AM   Final    Course Elapsed Days 02/05/2025 28   Final    Reference Point ID 02/05/2025 Prostate SIB   Final    Reference Point Dosage Given to Da* 02/05/2025 52.5  Gy Final    Reference Point Session Dosage Giv* 02/05/2025 2.5  Gy Final    Reference Point ID 02/05/2025 PTV 50.4   Final    Reference Point Dosage Given to Da* 02/05/2025 37.8  Gy Final    Reference Point Session Dosage Giv* 02/05/2025 1.8  Gy Final    Plan ID 02/05/2025 Prostate SIB   Final    Plan Name 02/05/2025 Prostate SIB Final   Final    Plan Fractions Treated to Date 02/05/2025 21   Final    Plan Total Fractions Prescribed 02/05/2025 28   Final    Plan Prescribed Dose Per Fraction 02/05/2025 2.5  Gy Final    Plan Total Prescribed Dose 02/05/2025 7,000  cGy Final    Plan Primary Reference Point 02/05/2025 Prostate SIB   Final   Hospital Outpatient Visit on 02/04/2025   Component Date Value Ref Range Status    Course ID 02/04/2025 1   Final    Course Intent 02/04/2025 Unknown   Final    Course Start Date 02/04/2025 11/5/2024  5:15 PM   Final    Course First Treatment Date 02/04/2025 1/8/2025  10:34 AM   Final    Course Last Treatment Date 02/04/2025 2/4/2025 10:51 AM   Final    Course Elapsed Days 02/04/2025 27   Final    Reference Point ID 02/04/2025 Prostate SIB   Final    Reference Point Dosage Given to Da* 02/04/2025 50  Gy Final    Reference Point Session Dosage Giv* 02/04/2025 2.5  Gy Final    Reference Point ID 02/04/2025 PTV 50.4   Final    Reference Point Dosage Given to Da* 02/04/2025 36  Gy Final    Reference Point Session Dosage Giv* 02/04/2025 1.8  Gy Final    Plan ID 02/04/2025 Prostate SIB   Final    Plan Name 02/04/2025 Prostate SIB Final   Final    Plan Fractions Treated to Date 02/04/2025 20   Final    Plan Total Fractions Prescribed 02/04/2025 28   Final    Plan Prescribed Dose Per Fraction 02/04/2025 2.5  Gy Final    Plan Total Prescribed Dose 02/04/2025 7,000  cGy Final    Plan Primary Reference Point 02/04/2025 Prostate SIB   Final   There may be more visits with results that are not included.        Procedure:       Assessment/Plan:   Prostate cancer:  He returns today status post biopsy for extensive discussion of prostate cancer.  We discussed staging and grading of the disease.  I described with a [place] system being from a 2 to10 scale with the most common low-grade pattern being a [place] 6.  I discussed the staging workup including a total body bone scan and CT scan especially with a PSA greater than 10.  We discussed the various options at length. I discussed a radical retropubic prostatectomy done in the traditional fashion and using the robotic technique.  I then discussed radiation treatment both seed therapy and external beam therapy using Gold fiduciary markers.  We talked about some of the other alternatives such as a cryosurgical ablation at high intensity focused ultrasound as being viable alternatives but not recommended at this time.  He focused on aggressive watchful waiting and explained that currently low literature it's been discovered that a lot of men can  actually observe it especially with numerous other comorbidities cannot have problems from the cancer by itself.  Talked about hormonal ablation and the side effects of creation of insulin resistance.  Overall, the patient was given appropriate literature, is going to think about it, and I'm going to revisit the topic with them after the next office visit.  Doing very well after a course of CyberKnife          This document has been electronically signed by NELIDA DE LEÓN MD April 17, 2025 09:57 EDT    Dictated Utilizing Dragon Dictation: Part of this note may be an electronic transcription/translation of spoken language to printed text using the Dragon Dictation System.

## 2025-04-18 LAB — PSA SERPL-MCNC: 0.12 NG/ML (ref 0–4)

## 2025-05-08 ENCOUNTER — TELEPHONE (OUTPATIENT)
Dept: UROLOGY | Facility: CLINIC | Age: 75
End: 2025-05-08
Payer: MEDICARE

## 2025-05-27 ENCOUNTER — TELEPHONE (OUTPATIENT)
Dept: RADIATION ONCOLOGY | Facility: HOSPITAL | Age: 75
End: 2025-05-27
Payer: MEDICARE

## 2025-05-27 ENCOUNTER — TELEPHONE (OUTPATIENT)
Age: 75
End: 2025-05-27
Payer: MEDICARE

## 2025-05-27 DIAGNOSIS — C61 PROSTATE CANCER: Primary | ICD-10-CM

## 2025-05-27 NOTE — TELEPHONE ENCOUNTER
Shari from [employer] ph [phone] called stating PET scan will not be covered due to the patient does have TX response with his last PSA being 0.12.

## 2025-05-27 NOTE — TELEPHONE ENCOUNTER
Attempted to contact pt regarding upcoming appointments.  No answer.  Left vm asking him to return my call.

## 2025-05-27 NOTE — TELEPHONE ENCOUNTER
Pt and wife called back.  I explained that his insurance denied coverage for the PET scan so we had to cancel it.  We have ordered a CT abd/pelvis to be done on 6/20/2025 11:15 am.  Pt to arrive at 10:15 am [address] Then Dr. Coley will see him at 2pm at our [place] office to go over the results. Pt and wife verbalized understanding.

## 2025-06-06 ENCOUNTER — TELEPHONE (OUTPATIENT)
Dept: RADIATION ONCOLOGY | Facility: HOSPITAL | Age: 75
End: 2025-06-06
Payer: MEDICARE

## 2025-06-06 NOTE — TELEPHONE ENCOUNTER
Spoke with pt's wife and explained that Dr Coley was taking Fri June 20 th off.  I asked if they would be available to come the day before Thurs June 19th. Wife states that would be fine. Instructed to arrive at 9:30 am [facility] for CT scan.  Pt to be NPO 2 hours prior to scan.  Pt then has re-eval appointment with Dr. Coley at 11:30 am [address] [place] [address] to go over results of scan.  Wife verbalized understanding.

## 2025-06-16 ENCOUNTER — HOSPITAL ENCOUNTER (OUTPATIENT)
Facility: HOSPITAL | Age: 75
Setting detail: RADIATION/ONCOLOGY SERIES
End: 2025-06-16
Payer: MEDICARE

## 2025-06-19 ENCOUNTER — OFFICE VISIT (OUTPATIENT)
Age: 75
End: 2025-06-19
Payer: MEDICARE

## 2025-06-19 ENCOUNTER — HOSPITAL ENCOUNTER (OUTPATIENT)
Dept: CT IMAGING | Facility: HOSPITAL | Age: 75
Discharge: HOME | End: 2025-06-19
Admitting: RADIOLOGY
Payer: MEDICARE

## 2025-06-19 VITALS
TEMPERATURE: 97.5 F | SYSTOLIC BLOOD PRESSURE: 126 MMHG | HEART RATE: 67 BPM | RESPIRATION RATE: 20 BRPM | DIASTOLIC BLOOD PRESSURE: 85 MMHG | OXYGEN SATURATION: 98 % | WEIGHT: 231.3 LBS | BODY MASS INDEX: 35.17 KG/M2

## 2025-06-19 DIAGNOSIS — C61 PROSTATE CANCER: Primary | ICD-10-CM

## 2025-06-19 DIAGNOSIS — C61 PROSTATE CANCER: ICD-10-CM

## 2025-06-19 PROCEDURE — G0463 HOSPITAL OUTPT CLINIC VISIT: HCPCS

## 2025-06-19 PROCEDURE — 25510000001 IOPAMIDOL 61 % SOLUTION: Performed by: RADIOLOGY

## 2025-06-19 PROCEDURE — 74178 CT ABD&PLV WO CNTR FLWD CNTR: CPT

## 2025-06-19 RX ORDER — IOPAMIDOL 612 MG/ML
85 INJECTION, SOLUTION INTRAVASCULAR
Status: COMPLETED | OUTPATIENT
Start: 2025-06-19 | End: 2025-06-19

## 2025-06-19 RX ORDER — ANASTROZOLE 1 MG/1
TABLET ORAL
COMMUNITY
End: 2025-06-19

## 2025-06-19 RX ORDER — TAMSULOSIN HYDROCHLORIDE 0.4 MG/1
CAPSULE ORAL
COMMUNITY
End: 2025-06-19

## 2025-06-19 RX ADMIN — IOPAMIDOL 85 ML: 612 INJECTION, SOLUTION INTRAVENOUS at 10:14

## 2025-06-19 NOTE — PROGRESS NOTES
RE-EVALUATION    PATIENT:                                                      Girish Moon  :                                                          1950  DATE:                          2025   DIAGNOSIS:     Prostate cancer  - Stage IIIC (cT2c, cN0, cM0, PSA: 29.6, Grade Group: 5)  - Stage KAROL (cT3b, cN1, cM0, PSA: 29.6, Grade Group: 5)         BRIEF HISTORY:  The patient is a very pleasant 75 y.o. male  with high risk prostate cancer.  He was initially felt to have clinically localized disease, as conventional staging including MRI and CT abdomen pelvis showed no obvious extraprostatic spread, no pathologic adenopathy, and no abnormality involving the seminal vesicles.    Nuclear medicine bone scan showed no evidence of metastasis.  He was initiated on androgen ablation with bicalutamide, and sent for consideration of CyberKnife SBRT.  However, further work-up with PSMA PET/CT revealed uptake in the proximal seminal vesicles (SUV 25.2), consistent with direct extension into the seminal vesicles.  Additionally, 2 small subcentimeter left iliac lymph nodes appeared hypermetabolic (SUV 8.9), consistent with regional lymph node metastasis.  Expectedly, there was hypermetabolism (SUV 53.6) in the periphery of the left side of the prostate at the apex and mid body.  Given these findings, definitive treatment plan appropriately shifted to moderate hypofractionated pelvic irradiation with concurrent androgen ablation.  The patient received his first 6-month LHRH agonist injection of Lupron on 2024.  He then completed a course of IMRT on 2025.  The prostate gland and seminal vesicles and PET positive left iliac lymph nodes were treated to a dose of 70 Gray in daily 28 fractions.  Concurrently, the remainder of the pelvic lymphatics received a minimum dose of 50.4 Gray using SIB technique.  He tolerated treatment well.  He did experience mild exacerbation of urinary irritative/outflow obstructive  symptoms, namely dysuria which has resolved and increased urinary frequency/urgency which continue to kostas.  He also reports urinary stream is gradually getting stronger.  He denies gross hematuria.  He denies urinary incontinence.  He experienced some orthostatic hypotension and syncopal episode felt to potentially relate to Tamsulosin.  He was prescribed a more selective alpha blocker with Alfuzosin, but he elected to discontinue this after one dose.  He has not had any further issues with blood pressure or feeling faint.  He denies GI side effects, hematochezia, or change in bowel function.  He reports mild, tolerable hot flashes which he relates to androgen ablation.  Energy level is improving.  No new aches or pains.  Overall, he feels well.  PSA was 0.0.121 ng/mL on 4/17/2025.  CT abdomen pelvis performed earlier today shows decreasing size of the prostate gland.  The capsule is smooth.  No evidence of  extraprostatic spread.  The previously noted left hemipelvic lymphadenopathy is decreased in size and now appears normal.  No new adenopathy or evidence of metastatic disease identified.    No Known Allergies    Review of Systems   Constitutional:  Positive for fatigue.   Neurological:  Positive for light-headedness.           IPSS Questionnaire (AUA-7):  Over the past month…    1)  Incomplete Emptying  How often have you had a sensation of not emptying your bladder?  1 - Less than 1 time in 5   2)  Frequency  How often have you had to urinate less than every two hours? 2 - Less than half the time   3)  Intermittency  How often have you found you stopped and started again several times when you urinated?  1 - Less than 1 time in 5   4) Urgency  How often have you found it difficult to postpone urination?  1 - Less than 1 time in 5   5) Weak Stream  How often have you had a weak urinary stream?  0 - Not at all   6) Straining  How often have you had to push or strain to begin urination?  0 - Not at all   7)  Nocturia  How many times did you typically get up at night to urinate?  5 - 5+ times   Total Score:  10       Quality of life due to urinary symptoms:  If you were to spend the rest of your life with your urinary condition the way it is now, how would you feel about that? 2-Mostly Satisfied   Urine Leakage (Incontinence) 0-No Leakage     Sexual Health Inventory  Current Status    1)  How do you rate your confidence that you could achieve and keep an erection? 3-Moderate   2) When you had erections with sexual stimulation, how often were your erections hard enough for penetration (entering your partner)? 3-Sometime (about half the time)   3)  During sexual intercourse, how often were you able to maintain your erection after you had penetrated (entered) into your partner? 2-A few times (much less than half the time)   4) During sexual intercourse, how difficult was it to maintain your erection to completion of intercourse? 3-Difficult   5) When you attempted sexual intercourse, how often was it satisfactory to you? 4-Most times (much more than half the time)   Total Score: 15       Bowel Health Inventory  Current Status: 0-No problems, no rectal bleeding, no discharge, less then 5 bowel movements a day          Objective   VITAL SIGNS:   Vitals:    06/19/25 1135   BP: 126/85   Pulse: 67   Resp: 20   Temp: 97.5 °F (36.4 °C)   TempSrc: Oral   SpO2: 98%   Weight: 105 kg (231 lb 4.8 oz)   PainSc: 0-No pain        Karnofsky score: 80   KSP %:  80    Physical Exam  Vitals and nursing note reviewed.   Constitutional:       Appearance: He is well-developed.   HENT:      Head: Normocephalic and atraumatic.   Cardiovascular:      Rate and Rhythm: Normal rate and regular rhythm.      Heart sounds: Normal heart sounds. No murmur heard.  Pulmonary:      Effort: Pulmonary effort is normal.      Breath sounds: Normal breath sounds. No wheezing or rales.   Abdominal:      General: Bowel sounds are normal. There is no distension.       Palpations: Abdomen is soft.      Tenderness: There is no abdominal tenderness.   Genitourinary:     Prostate: Enlarged (50 to 60 cc (previously ), softer, round, smooth with no induration or nodularity.). Not tender and no nodules present.      Rectum: No mass, tenderness, anal fissure, external hemorrhoid or internal hemorrhoid. Normal anal tone.   Musculoskeletal:         General: No tenderness. Normal range of motion.      Cervical back: Normal range of motion and neck supple.   Lymphadenopathy:      Cervical: No cervical adenopathy.      Upper Body:      Right upper body: No supraclavicular adenopathy.      Left upper body: No supraclavicular adenopathy.   Skin:     General: Skin is warm and dry.   Neurological:      Mental Status: He is alert and oriented to person, place, and time.      Sensory: No sensory deficit.   Psychiatric:         Behavior: Behavior normal.         Thought Content: Thought content normal.         Judgment: Judgment normal.              The following portions of the patient's history were reviewed and updated as appropriate: allergies, current medications, past family history, past medical history, past social history, past surgical history, and problem list.    Diagnoses and all orders for this visit:    Prostate cancer    Other orders  -     Discontinue: tamsulosin (FLOMAX) 0.4 MG capsule 24 hr capsule; Oral for 30 Days  -     Discontinue: anastrozole (ARIMIDEX) 1 MG tablet; Oral for 90 Days      IMPRESSION:  Prostate cancer, Iliana's 5+4 = 9, clinical stage increased to KAROL (T3b, N1, M0) based on PSMA PET scan.  He is tolerating concurrent androgen ablation.  4 month status post pelvic IMRT.  He tolerated treatment well.    He has had a very appropriate early biochemical response and radiographic response based on CT imaging today.    RECOMMENDATIONS: Continue urology surveillance in [facility] with antiandrogen therapy and PSA testing.  Follow-up with me in approximately  6 months.         Walker Coley MD    Approximate 25 minutes was spent reviewing records, with patient and for documentation.